# Patient Record
Sex: MALE | Race: WHITE | Employment: FULL TIME | URBAN - METROPOLITAN AREA
[De-identification: names, ages, dates, MRNs, and addresses within clinical notes are randomized per-mention and may not be internally consistent; named-entity substitution may affect disease eponyms.]

---

## 2017-01-01 ENCOUNTER — MYC REFILL (OUTPATIENT)
Dept: PEDIATRICS | Facility: CLINIC | Age: 32
End: 2017-01-01

## 2017-01-01 DIAGNOSIS — B00.1 RECURRENT COLD SORES: ICD-10-CM

## 2017-01-02 NOTE — TELEPHONE ENCOUNTER
acyclovir (ZOVIRAX) 400 MG tablet     Last Written Prescription Date: 1/11/16  Last Fill Quantity: 15, # refills: 3  Last Office Visit with [facility], [facility] or [facility] prescribing provider: 10/31/16        No results found for: CR

## 2017-01-02 NOTE — TELEPHONE ENCOUNTER
Message from [employer]:  Original authorizing provider: MD Tu Pham would like a refill of the following medications:  acyclovir (ZOVIRAX) 400 MG tablet [López Tristan MD]    Preferred pharmacy: [facility] [address], [address]    Comment:

## 2017-01-05 RX ORDER — ACYCLOVIR 400 MG/1
400 TABLET ORAL 3 TIMES DAILY
Qty: 15 TABLET | Refills: 3 | Status: SHIPPED | OUTPATIENT
Start: 2017-01-05 | End: 2017-09-05

## 2017-02-09 ENCOUNTER — MYC REFILL (OUTPATIENT)
Dept: PEDIATRICS | Facility: CLINIC | Age: 32
End: 2017-02-09

## 2017-02-09 DIAGNOSIS — F98.8 ADD (ATTENTION DEFICIT DISORDER): ICD-10-CM

## 2017-02-09 NOTE — TELEPHONE ENCOUNTER
methylphenidate ER (BRAND OR BX/ZC/AUTHORIZED GENERIC) 54 MG CR tablet      Last Written Prescription Date:  10/31/16  Last Fill Quantity: 90,   # refills: 0  Last Office Visit with [facility], [facility] or [facility] prescribing provider: 10/31/16.  Future Office visit:       Routing refill request to provider for review/approval because:  Drug not on the [facility], [facility] or [facility] refill protocol or controlled substance

## 2017-02-09 NOTE — TELEPHONE ENCOUNTER
Message from [employer]:  Original authorizing provider: MD Tu Pham would like a refill of the following medications:  methylphenidate ER (BRAND OR BX/ZC/AUTHORIZED GENERIC) 54 MG CR tablet [López Tristan MD]    Preferred pharmacy: [facility] [address], [address]    Comment:

## 2017-02-10 RX ORDER — METHYLPHENIDATE HYDROCHLORIDE 54 MG/1
54 TABLET ORAL EVERY MORNING
Qty: 90 TABLET | Refills: 0 | Status: SHIPPED | OUTPATIENT
Start: 2017-02-10 | End: 2017-03-20

## 2017-02-10 NOTE — TELEPHONE ENCOUNTER
Is due for TEJAL, fasting labs, medication recheck in 4/2017- please inform to schedule for both.  Rx is ready for . Please notify patient.

## 2017-03-20 ENCOUNTER — MYC REFILL (OUTPATIENT)
Dept: PEDIATRICS | Facility: CLINIC | Age: 32
End: 2017-03-20

## 2017-03-20 DIAGNOSIS — F98.8 ADD (ATTENTION DEFICIT DISORDER): ICD-10-CM

## 2017-03-20 RX ORDER — METHYLPHENIDATE HYDROCHLORIDE 54 MG/1
54 TABLET ORAL EVERY MORNING
Qty: 30 TABLET | Refills: 0 | Status: SHIPPED | OUTPATIENT
Start: 2017-03-20 | End: 2017-05-09

## 2017-05-05 ENCOUNTER — MYC REFILL (OUTPATIENT)
Dept: PEDIATRICS | Facility: CLINIC | Age: 32
End: 2017-05-05

## 2017-05-05 DIAGNOSIS — F98.8 ADD (ATTENTION DEFICIT DISORDER): ICD-10-CM

## 2017-05-05 RX ORDER — METHYLPHENIDATE HYDROCHLORIDE 54 MG/1
54 TABLET ORAL EVERY MORNING
Qty: 30 TABLET | Refills: 0 | OUTPATIENT
Start: 2017-05-05

## 2017-05-05 NOTE — TELEPHONE ENCOUNTER
Message from [employer]:  Original authorizing provider: MD Tu Pham would like a refill of the following medications:  methylphenidate ER (CONCERTA) 54 MG CR tablet [López Tristan MD]    Preferred pharmacy: [facility] [address], [address]    Comment:

## 2017-05-05 NOTE — TELEPHONE ENCOUNTER
Last OV- 10/2016  Is due for TEJAL, 6month recheck  I see that visit on 5/2 was cancelled  Needs office visit

## 2017-05-05 NOTE — TELEPHONE ENCOUNTER
methylphenidate ER (CONCERTA) 54 MG CR tablet      Last Written Prescription Date:  03/20/17  Last Fill Quantity: 30,   # refills: 0  Last Office Visit with [facility], [facility] or [facility] prescribing provider: 10/31/16  Future Office visit:       Routing refill request to provider for review/approval because:  Drug not on the [facility], [facility] or [facility] refill protocol or controlled substance

## 2017-05-08 ENCOUNTER — MYC MEDICAL ADVICE (OUTPATIENT)
Dept: PEDIATRICS | Facility: CLINIC | Age: 32
End: 2017-05-08

## 2017-05-08 DIAGNOSIS — F98.8 ADD (ATTENTION DEFICIT DISORDER): ICD-10-CM

## 2017-05-09 RX ORDER — METHYLPHENIDATE HYDROCHLORIDE 54 MG/1
54 TABLET ORAL EVERY MORNING
Qty: 30 TABLET | Refills: 0 | Status: SHIPPED | OUTPATIENT
Start: 2017-05-09 | End: 2017-05-23

## 2017-05-09 NOTE — TELEPHONE ENCOUNTER
My chart message sent to patient advising him that a script for Concerta has been approved and is at the , check in #1 for him to .    Keara Forbes CMA

## 2017-05-09 NOTE — TELEPHONE ENCOUNTER
methylphenidate ER (CONCERTA) 54 MG CR tablet      Last Written Prescription Date:  3/20/17  Last Fill Quantity: 30,   # refills: 0  Last Office Visit with [facility] or  [employer] prescribing provider: 10/31/16  Future Office visit:    Next 5 appointments (look out 90 days)     May 23, 2017  9:40 AM CDT   MyChart Long with López Tristan MD   [facility]    [address] [place] [address] [phone]                   Routing refill request to provider for review/approval because:  Drug not on the [facility] or  [employer] refill protocol or controlled substance    Last refill refused by Dr. Tristan on 5/5/17. Dr. Tristan out of office until 5/15/17. Routed to provider pool for review.  Joni Cordero, CMA

## 2017-05-22 ENCOUNTER — DOCUMENTATION ONLY (OUTPATIENT)
Dept: LAB | Facility: CLINIC | Age: 32
End: 2017-05-22

## 2017-05-22 DIAGNOSIS — Z13.29 SCREENING FOR THYROID DISORDER: ICD-10-CM

## 2017-05-22 DIAGNOSIS — Z13.1 SCREENING FOR DIABETES MELLITUS (DM): ICD-10-CM

## 2017-05-22 DIAGNOSIS — Z00.00 ENCOUNTER FOR ROUTINE ADULT HEALTH EXAMINATION WITHOUT ABNORMAL FINDINGS: Primary | ICD-10-CM

## 2017-05-22 DIAGNOSIS — Z13.0 SCREENING FOR DEFICIENCY ANEMIA: ICD-10-CM

## 2017-05-22 DIAGNOSIS — E78.00 HYPERCHOLESTEREMIA: ICD-10-CM

## 2017-05-22 DIAGNOSIS — R73.01 ELEVATED FASTING GLUCOSE: ICD-10-CM

## 2017-05-22 DIAGNOSIS — Z13.6 CARDIOVASCULAR SCREENING; LDL GOAL LESS THAN 160: ICD-10-CM

## 2017-05-22 NOTE — PROGRESS NOTES
Routing pended labs to provider to review.     Jeanne Luis RN, [facility] [[email or web address]]

## 2017-05-23 ENCOUNTER — OFFICE VISIT (OUTPATIENT)
Dept: PEDIATRICS | Facility: CLINIC | Age: 32
End: 2017-05-23
Payer: COMMERCIAL

## 2017-05-23 VITALS
HEART RATE: 61 BPM | WEIGHT: 246.5 LBS | HEIGHT: 70 IN | TEMPERATURE: 98.3 F | SYSTOLIC BLOOD PRESSURE: 126 MMHG | BODY MASS INDEX: 35.29 KG/M2 | DIASTOLIC BLOOD PRESSURE: 86 MMHG | OXYGEN SATURATION: 99 %

## 2017-05-23 DIAGNOSIS — E66.9 OBESITY (BMI 30-39.9): ICD-10-CM

## 2017-05-23 DIAGNOSIS — Z13.6 CARDIOVASCULAR SCREENING; LDL GOAL LESS THAN 160: ICD-10-CM

## 2017-05-23 DIAGNOSIS — Z13.29 SCREENING FOR THYROID DISORDER: ICD-10-CM

## 2017-05-23 DIAGNOSIS — Z00.00 ENCOUNTER FOR ROUTINE ADULT HEALTH EXAMINATION WITHOUT ABNORMAL FINDINGS: ICD-10-CM

## 2017-05-23 DIAGNOSIS — Z13.1 SCREENING FOR DIABETES MELLITUS (DM): ICD-10-CM

## 2017-05-23 DIAGNOSIS — R73.01 ELEVATED FASTING GLUCOSE: ICD-10-CM

## 2017-05-23 DIAGNOSIS — F98.8 ADD (ATTENTION DEFICIT DISORDER): ICD-10-CM

## 2017-05-23 DIAGNOSIS — E78.00 HYPERCHOLESTEREMIA: ICD-10-CM

## 2017-05-23 DIAGNOSIS — B00.1 RECURRENT COLD SORES: ICD-10-CM

## 2017-05-23 DIAGNOSIS — Z13.0 SCREENING FOR DEFICIENCY ANEMIA: ICD-10-CM

## 2017-05-23 DIAGNOSIS — Z00.00 ROUTINE GENERAL MEDICAL EXAMINATION AT A HEALTH CARE FACILITY: Primary | ICD-10-CM

## 2017-05-23 LAB
ALBUMIN SERPL-MCNC: 4.3 G/DL (ref 3.4–5)
ALP SERPL-CCNC: 79 U/L (ref 40–150)
ALT SERPL W P-5'-P-CCNC: 65 U/L (ref 0–70)
ANION GAP SERPL CALCULATED.3IONS-SCNC: 6 MMOL/L (ref 3–14)
AST SERPL W P-5'-P-CCNC: 30 U/L (ref 0–45)
BASOPHILS # BLD AUTO: 0 10E9/L (ref 0–0.2)
BASOPHILS NFR BLD AUTO: 0.1 %
BILIRUB SERPL-MCNC: 0.5 MG/DL (ref 0.2–1.3)
BUN SERPL-MCNC: 12 MG/DL (ref 7–30)
CALCIUM SERPL-MCNC: 9 MG/DL (ref 8.5–10.1)
CHLORIDE SERPL-SCNC: 108 MMOL/L (ref 94–109)
CHOLEST SERPL-MCNC: 194 MG/DL
CO2 SERPL-SCNC: 28 MMOL/L (ref 20–32)
CREAT SERPL-MCNC: 0.92 MG/DL (ref 0.66–1.25)
DIFFERENTIAL METHOD BLD: NORMAL
EOSINOPHIL # BLD AUTO: 0.2 10E9/L (ref 0–0.7)
EOSINOPHIL NFR BLD AUTO: 2.3 %
ERYTHROCYTE [DISTWIDTH] IN BLOOD BY AUTOMATED COUNT: 13.5 % (ref 10–15)
GFR SERPL CREATININE-BSD FRML MDRD: NORMAL ML/MIN/1.7M2
GLUCOSE SERPL-MCNC: 91 MG/DL (ref 70–99)
HBA1C MFR BLD: 5 % (ref 4.3–6)
HCT VFR BLD AUTO: 43.7 % (ref 40–53)
HDLC SERPL-MCNC: 45 MG/DL
HGB BLD-MCNC: 14.9 G/DL (ref 13.3–17.7)
LDLC SERPL CALC-MCNC: 134 MG/DL
LYMPHOCYTES # BLD AUTO: 2.5 10E9/L (ref 0.8–5.3)
LYMPHOCYTES NFR BLD AUTO: 28.4 %
MCH RBC QN AUTO: 29.6 PG (ref 26.5–33)
MCHC RBC AUTO-ENTMCNC: 34.1 G/DL (ref 31.5–36.5)
MCV RBC AUTO: 87 FL (ref 78–100)
MONOCYTES # BLD AUTO: 0.7 10E9/L (ref 0–1.3)
MONOCYTES NFR BLD AUTO: 7.9 %
NEUTROPHILS # BLD AUTO: 5.4 10E9/L (ref 1.6–8.3)
NEUTROPHILS NFR BLD AUTO: 61.3 %
NONHDLC SERPL-MCNC: 149 MG/DL
PLATELET # BLD AUTO: 222 10E9/L (ref 150–450)
POTASSIUM SERPL-SCNC: 4 MMOL/L (ref 3.4–5.3)
PROT SERPL-MCNC: 7.2 G/DL (ref 6.8–8.8)
RBC # BLD AUTO: 5.03 10E12/L (ref 4.4–5.9)
SODIUM SERPL-SCNC: 142 MMOL/L (ref 133–144)
TRIGL SERPL-MCNC: 77 MG/DL
TSH SERPL DL<=0.005 MIU/L-ACNC: 1.25 MU/L (ref 0.4–4)
WBC # BLD AUTO: 8.8 10E9/L (ref 4–11)

## 2017-05-23 PROCEDURE — 99395 PREV VISIT EST AGE 18-39: CPT | Performed by: FAMILY MEDICINE

## 2017-05-23 PROCEDURE — 83036 HEMOGLOBIN GLYCOSYLATED A1C: CPT | Performed by: FAMILY MEDICINE

## 2017-05-23 PROCEDURE — 80061 LIPID PANEL: CPT | Performed by: FAMILY MEDICINE

## 2017-05-23 PROCEDURE — 36415 COLL VENOUS BLD VENIPUNCTURE: CPT | Performed by: FAMILY MEDICINE

## 2017-05-23 PROCEDURE — 80050 GENERAL HEALTH PANEL: CPT | Performed by: FAMILY MEDICINE

## 2017-05-23 RX ORDER — METHYLPHENIDATE HYDROCHLORIDE 54 MG/1
54 TABLET ORAL EVERY MORNING
Qty: 30 TABLET | Refills: 0 | Status: SHIPPED | OUTPATIENT
Start: 2017-06-09 | End: 2017-07-25

## 2017-05-23 ASSESSMENT — PAIN SCALES - GENERAL: PAINLEVEL: NO PAIN (0)

## 2017-05-23 NOTE — PROGRESS NOTES
SUBJECTIVE:     CC: Tu Chaney is an 32 year old male who presents for preventative health visit.     Healthy Habits:    Do you get at least three servings of calcium containing foods daily (dairy, green leafy vegetables, etc.)? yes    Amount of exercise or daily activities, outside of work: 1-2 day(s) per week    Problems taking medications regularly No    Medication side effects: No    Have you had an eye exam in the past two years? no    Do you see a dentist twice per year? no    Do you have sleep apnea, excessive snoring or daytime drowsiness?no        Medication Followup of ADD    Taking Medication as prescribed: yes    Side Effects:  None    Medication Helping Symptoms:  yes     Hyperlipidemia Follow-Up      Rate your low fat/cholesterol diet?: not monitoring fat    Taking statin?  No    Other lipid medications/supplements?:  none       Today's PHQ-2 Score:   PHQ-2 ( 1999 [employer]) 5/23/2017 2/22/2016   Q1: Little interest or pleasure in doing things 0 0   Q2: Feeling down, depressed or hopeless 0 0   PHQ-2 Score 0 0       Abuse: Current or Past(Physical, Sexual or Emotional)- No  Do you feel safe in your environment - Yes    Social History   Substance Use Topics     Smoking status: Never Smoker     Smokeless tobacco: Never Used      Comment: no passive exposure     Alcohol use Yes      Comment: occ     The patient does not drink >3 drinks per day nor >7 drinks per week.    Last PSA: No results found for: PSA    Recent Labs   Lab Test  09/23/14   0808  02/15/12   0749   CHOL  208*  226*   HDL  43  37*   LDL  141*  143*   TRIG  122  230*   CHOLHDLRATIO  4.8  6.1*       Reviewed orders with patient. Reviewed health maintenance and updated orders accordingly - Yes    Reviewed and updated as needed this visit by clinical staff  Tobacco  Allergies  Meds  Med Hx  Surg Hx  Fam Hx  Soc Hx        Reviewed and updated as needed this visit by Provider          Past Medical History:   Diagnosis Date     adhd        Past Surgical History:   Procedure Laterality Date     TONSILLECTOMY              ROS:  C: NEGATIVE for fever, chills, change in weight  I: NEGATIVE for worrisome rashes, moles or lesions  E: NEGATIVE for vision changes or irritation  ENT: NEGATIVE for ear, mouth and throat problems  R: NEGATIVE for significant cough or SOB  CV: NEGATIVE for chest pain, palpitations or peripheral edema  GI: NEGATIVE for nausea, abdominal pain, heartburn, or change in bowel habits   male: negative for dysuria, hematuria, decreased urinary stream, erectile dysfunction, urethral discharge  M: NEGATIVE for significant arthralgias or myalgia  N: NEGATIVE for weakness, dizziness or paresthesias  E: NEGATIVE for temperature intolerance, skin/hair changes  H: NEGATIVE for bleeding problems  P: NEGATIVE for changes in mood or affect    Problem list, Medication list, Allergies, and Medical/Social/Surgical histories reviewed in [facility] and updated as appropriate.  Labs reviewed in EPIC  BP Readings from Last 3 Encounters:   05/23/17 126/86   10/31/16 134/80   02/22/16 140/86    Wt Readings from Last 3 Encounters:   05/23/17 246 lb 8 oz (111.8 kg)   10/31/16 244 lb 11.2 oz (111 kg)   02/22/16 242 lb 9.6 oz (110 kg)                  Patient Active Problem List   Diagnosis     ADD (attention deficit disorder)     CARDIOVASCULAR SCREENING; LDL GOAL LESS THAN 160     Recurrent cold sores     Obesity (BMI 30-39.9)     Hypercholesteremia     Elevated fasting glucose     Vasovagal near-syncope     Elevated blood pressure reading without diagnosis of hypertension     Past Surgical History:   Procedure Laterality Date     TONSILLECTOMY         Social History   Substance Use Topics     Smoking status: Never Smoker     Smokeless tobacco: Never Used      Comment: no passive exposure     Alcohol use Yes      Comment: occ     History reviewed. No pertinent family history.      Current Outpatient Prescriptions   Medication Sig Dispense Refill     [START ON  "6/9/2017] methylphenidate ER (CONCERTA) 54 MG CR tablet Take 1 tablet (54 mg) by mouth every morning 30 tablet 0     [DISCONTINUED] methylphenidate ER (CONCERTA) 54 MG CR tablet Take 1 tablet (54 mg) by mouth every morning 30 tablet 0     acyclovir (ZOVIRAX) 400 MG tablet Take 1 tablet (400 mg) by mouth 3 times daily (Patient not taking: Reported on 5/23/2017) 15 tablet 3     No Known Allergies  Recent Labs   Lab Test  05/23/17   0933  09/23/14   0808  02/15/12   0749  11/16/11   1046   A1C  5.0   --    --    --    LDL   --   141*  143*  155*   HDL   --   43  37*  42   TRIG   --   122  230*  77      OBJECTIVE:     /86  Pulse 61  Temp 98.3  F (36.8  C) (Oral)  Ht 5' 9.5\" (1.765 m)  Wt 246 lb 8 oz (111.8 kg)  SpO2 99%  BMI 35.88 kg/m2  EXAM:  GENERAL: healthy, alert and no distress  EYES: Eyes grossly normal to inspection, PERRL and conjunctivae and sclerae normal  HENT: ear canals and TM's normal, nose and mouth without ulcers or lesions  NECK: no adenopathy, no asymmetry, masses, or scars and thyroid normal to palpation  RESP: lungs clear to auscultation - no rales, rhonchi or wheezes  CV: regular rate and rhythm, normal S1 S2, no S3 or S4, no murmur, click or rub, no peripheral edema and peripheral pulses strong  ABDOMEN: soft, nontender, no hepatosplenomegaly, no masses and bowel sounds normal   (male): normal male genitalia without lesions or urethral discharge, no hernia  MS: no gross musculoskeletal defects noted, no edema  SKIN: no suspicious lesions or rashes  NEURO: Normal strength and tone, mentation intact and speech normal  PSYCH: mentation appears normal, affect normal/bright    ASSESSMENT/PLAN:     1. Routine general medical examination at a health care facility  : Discussed on regular exercises, healthy eating, self testicular exams  and routine dental checks.    2. ADD (attention deficit disorder)  stable, continue with Concerta 54 mg daily, recheck in 6 months  - methylphenidate ER " "(CONCERTA) 54 MG CR tablet; Take 1 tablet (54 mg) by mouth every morning  Dispense: 30 tablet; Refill: 0    3. CARDIOVASCULAR SCREENING; LDL GOAL LESS THAN 160  Will f/u on results and call with recommendations.      4. Recurrent cold sores  No recent episodes noted, refills are in place for zovirax    5. Elevated fasting glucose  Will f/u on results and call with recommendations.      6. Obesity (BMI 30-39.9)  Emphasized on weight loss, portion control, low calorie and low fat diet, healthy eating, regular exercises. Offered dietary consult, declined. Encouraged to enroll in a weight loss program for tracking on meal planning and weight.        COUNSELING:  Reviewed preventive health counseling, as reflected in patient instructions  Special attention given to:        Regular exercise       Healthy diet/nutrition    BP Screening:   Last 3 BP Readings:    BP Readings from Last 3 Encounters:   05/23/17 126/86   10/31/16 134/80   02/22/16 140/86       The following was recommended to the patient:  Re-screen BP within a year and recommended lifestyle modifications     reports that he has never smoked. He has never used smokeless tobacco.    Estimated body mass index is 35.88 kg/(m^2) as calculated from the following:    Height as of this encounter: 5' 9.5\" (1.765 m).    Weight as of this encounter: 246 lb 8 oz (111.8 kg).   Weight management plan: Discussed healthy diet and exercise guidelines and patient will follow up in 6 months in clinic to re-evaluate.    Counseling Resources:  ATP IV Guidelines  Pooled Cohorts Equation Calculator  FRAX Risk Assessment  ICSI Preventive Guidelines  Dietary Guidelines for Americans, 2010  USDA's MyPlate  ASA Prophylaxis  Lung CA Screening    López Tristan MD  [facility]  Chart documentation done in part with Dragon Voice recognition Software. Although reviewed after completion, some word and grammatical error may remain.    "

## 2017-05-23 NOTE — MR AVS SNAPSHOT
After Visit Summary   5/23/2017    Tu Chaney    MRN: [id number]           Patient Information     Date Of Birth          1985        Visit Information        Provider Department      5/23/2017 9:40 AM López Tristan MD [facility]        Today's Diagnoses     Routine general medical examination at a health care facility    -  1    ADD (attention deficit disorder)        CARDIOVASCULAR SCREENING; LDL GOAL LESS THAN 160        Recurrent cold sores          Care Instructions    schedule for recheck in 6 months      Preventive Health Recommendations  Male Ages 26 - 39    Yearly exam:             See your health care provider every year in order to  o   Review health changes.   o   Discuss preventive care.    o   Review your medicines if your doctor has prescribed any.    You should be tested each year for STDs (sexually transmitted diseases), if you re at risk.     After age 35, talk to your provider about cholesterol testing. If you are at risk for heart disease, have your cholesterol tested at least every 5 years.     If you are at risk for diabetes, you should have a diabetes test (fasting glucose).  Shots: Get a flu shot each year. Get a tetanus shot every 10 years.     Nutrition:    Eat at least 5 servings of fruits and vegetables daily.     Eat whole-grain bread, whole-wheat pasta and brown rice instead of white grains and rice.     Talk to your provider about Calcium and Vitamin D.     Lifestyle    Exercise for at least 150 minutes a week (30 minutes a day, 5 days a week). This will help you control your weight and prevent disease.     Limit alcohol to one drink per day.     No smoking.     Wear sunscreen to prevent skin cancer.     See your dentist every six months for an exam and cleaning.           Follow-ups after your visit        Who to contact     If you have questions or need follow up information about today's clinic visit or your schedule please contact [facility] directly at [phone].  Normal or non-critical lab and imaging results will be communicated to you by Pervaciohart, letter or phone within 4 business days after the clinic has received the results. If you do not hear from us within 7 days, please contact the clinic through Pervaciohart or phone. If you have a critical or abnormal lab result, we will notify you by phone as soon as possible.  Submit refill requests through Roadmap or call your pharmacy and they will forward the refill request to us. Please allow 3 business days for your refill to be completed.          Additional Information About Your Visit        Roadmap Information     Roadmap gives you secure access to your electronic health record. If you see a primary care provider, you can also send messages to your care team and make appointments. If you have questions, please call your primary care clinic.  If you do not have a primary care provider, please call [phone] and they will assist you.      Roadmap is an electronic gateway that provides easy, online access to your medical records. With Roadmap, you can request a clinic appointment, read your test results, renew a prescription or communicate with your care team.     To access your existing account, please contact your [facility] or call [phone] for assistance.        Care EveryWhere ID     This is your Care EveryWhere ID. This could be used by other organizations to access your [facility] medical records  [id number]        Your Vitals Were     Pulse Temperature Height Pulse Oximetry BMI (Body Mass Index)       61 98.3  F (36.8  C) (Oral) 5' 9.5\" (1.765 m) 99% 35.88 kg/m2        Blood Pressure from Last 3 Encounters:   05/23/17 126/86   10/31/16 134/80   02/22/16 140/86    Weight from Last 3 Encounters:   05/23/17 246 lb 8 oz (111.8 kg)   10/31/16 244 lb 11.2 oz (111 kg)   02/22/16 242 lb 9.6 oz (110 kg)              Today, you had the " following     No orders found for display         Where to get your medicines      Some of these will need a paper prescription and others can be bought over the counter.  Ask your nurse if you have questions.     Bring a paper prescription for each of these medications     methylphenidate ER 54 MG CR tablet          Primary Care Provider Office Phone # Fax #    López Tristan [phone]       [facility] [address]  [facility] [address]        Thank you!     Thank you for choosing [facility]  for your care. Our goal is always to provide you with excellent care. Hearing back from our patients is one way we can continue to improve our services. Please take a few minutes to complete the written survey that you may receive in the mail after your visit with us. Thank you!             Your Updated Medication List - Protect others around you: Learn how to safely use, store and throw away your medicines at www.disposemymeds.org.          This list is accurate as of: 5/23/17 10:12 AM.  Always use your most recent med list.                   Brand Name Dispense Instructions for use    acyclovir 400 MG tablet    ZOVIRAX    15 tablet    Take 1 tablet (400 mg) by mouth 3 times daily       methylphenidate ER 54 MG CR tablet   Start taking on:  6/9/2017    CONCERTA    30 tablet    Take 1 tablet (54 mg) by mouth every morning

## 2017-05-23 NOTE — NURSING NOTE
"Chief Complaint   Patient presents with     Physical       Initial /86  Pulse 61  Temp 98.3  F (36.8  C) (Oral)  Ht 5' 9.5\" (1.765 m)  Wt 246 lb 8 oz (111.8 kg)  SpO2 99%  BMI 35.88 kg/m2 Estimated body mass index is 35.88 kg/(m^2) as calculated from the following:    Height as of this encounter: 5' 9.5\" (1.765 m).    Weight as of this encounter: 246 lb 8 oz (111.8 kg).  BP completed using cuff size: red Cordero, SRINI    "

## 2017-05-23 NOTE — PATIENT INSTRUCTIONS
schedule for recheck in 6 months      Preventive Health Recommendations  Male Ages 26 - 39    Yearly exam:             See your health care provider every year in order to  o   Review health changes.   o   Discuss preventive care.    o   Review your medicines if your doctor has prescribed any.    You should be tested each year for STDs (sexually transmitted diseases), if you re at risk.     After age 35, talk to your provider about cholesterol testing. If you are at risk for heart disease, have your cholesterol tested at least every 5 years.     If you are at risk for diabetes, you should have a diabetes test (fasting glucose).  Shots: Get a flu shot each year. Get a tetanus shot every 10 years.     Nutrition:    Eat at least 5 servings of fruits and vegetables daily.     Eat whole-grain bread, whole-wheat pasta and brown rice instead of white grains and rice.     Talk to your provider about Calcium and Vitamin D.     Lifestyle    Exercise for at least 150 minutes a week (30 minutes a day, 5 days a week). This will help you control your weight and prevent disease.     Limit alcohol to one drink per day.     No smoking.     Wear sunscreen to prevent skin cancer.     See your dentist every six months for an exam and cleaning.

## 2017-05-23 NOTE — PROGRESS NOTES
Sean Mae,  Your lab results for fasting blood sugars and diabetes screening test results are both normal. The test results for thyroid functions, liver and kidneys, hemoglobin and blood count are all normal. Your fasting cholesterol is significantly improved from 2 years ago.  Please continue your efforts on healthy eating and regular exercises.   Let me know if you have any questions. Take care.  López Tristan MD

## 2017-07-25 ENCOUNTER — MYC REFILL (OUTPATIENT)
Dept: PEDIATRICS | Facility: CLINIC | Age: 32
End: 2017-07-25

## 2017-07-25 DIAGNOSIS — F98.8 ATTENTION DEFICIT DISORDER (ADD) WITHOUT HYPERACTIVITY: Primary | ICD-10-CM

## 2017-07-25 RX ORDER — METHYLPHENIDATE HYDROCHLORIDE 54 MG/1
54 TABLET ORAL EVERY MORNING
Qty: 30 TABLET | Refills: 0 | Status: SHIPPED | OUTPATIENT
Start: 2017-07-25 | End: 2017-08-27

## 2017-07-25 NOTE — TELEPHONE ENCOUNTER
methylphenidate ER (CONCERTA) 54 MG CR tablet      Last Written Prescription Date:  6/9/17  Last Fill Quantity: 30,   # refills: 0  Last Office Visit with [facility], [facility] or [facility] prescribing provider: 5/23/17  Future Office visit:       Routing refill request to provider for review/approval because:  Drug not on the [facility], [facility] or [facility] refill protocol or controlled substance

## 2017-07-25 NOTE — TELEPHONE ENCOUNTER
Message from [employer]:  Original authorizing provider: MD Tu Pham would like a refill of the following medications:  methylphenidate ER (CONCERTA) 54 MG CR tablet [López Tristan MD]    Preferred pharmacy: [facility] [address], [address]    Comment:

## 2017-08-27 ENCOUNTER — MYC REFILL (OUTPATIENT)
Dept: PEDIATRICS | Facility: CLINIC | Age: 32
End: 2017-08-27

## 2017-08-27 DIAGNOSIS — F98.8 ATTENTION DEFICIT DISORDER (ADD) WITHOUT HYPERACTIVITY: ICD-10-CM

## 2017-08-28 RX ORDER — METHYLPHENIDATE HYDROCHLORIDE 54 MG/1
54 TABLET ORAL EVERY MORNING
Qty: 30 TABLET | Refills: 0 | Status: SHIPPED | OUTPATIENT
Start: 2017-08-28 | End: 2017-10-09

## 2017-08-28 NOTE — TELEPHONE ENCOUNTER
methylphenidate ER (CONCERTA) 54 MG CR tablet      Last Written Prescription Date:  7/25/17  Last Fill Quantity: 30,   # refills: 0  Last Office Visit with [facility], [facility] or [facility] prescribing provider: 5/23/17  Future Office visit:    Next 5 appointments (look out 90 days)     Nov 14, 2017  8:00 AM CST   Return Visit with López Tristan MD   [facility]    [address] [place] [address] [phone]                   Routing refill request to provider for review/approval because:  Drug not on the [facility], [facility] or [facility] refill protocol or controlled substance

## 2017-08-28 NOTE — TELEPHONE ENCOUNTER
Message left on patients voicemail that the script he requested is ready for  at the  check in #1.    Keara Forbes CMA

## 2017-08-28 NOTE — TELEPHONE ENCOUNTER
Message from [employer]:  Original authorizing provider: MD Tu Pham would like a refill of the following medications:  methylphenidate ER (CONCERTA) 54 MG CR tablet [López Tristan MD]    Preferred pharmacy: [facility] [address], [address]    Comment:

## 2017-09-05 ENCOUNTER — MYC REFILL (OUTPATIENT)
Dept: PEDIATRICS | Facility: CLINIC | Age: 32
End: 2017-09-05

## 2017-09-05 DIAGNOSIS — B00.1 RECURRENT COLD SORES: ICD-10-CM

## 2017-09-05 NOTE — TELEPHONE ENCOUNTER
acyclovir (ZOVIRAX) 400 MG tablet     Last Written Prescription Date: 1/5/17  Last Fill Quantity: 15, # refills: 3  Last Office Visit with [facility], [facility] or [facility] prescribing provider: 5/23/17   Next 5 appointments (look out 90 days)     Nov 14, 2017  8:00 AM CST   Return Visit with López Tristan MD   [facility] ([facility]    [address] [place] [address] [phone]                   Creatinine   Date Value Ref Range Status   05/23/2017 0.92 0.66 - 1.25 mg/dL Final

## 2017-09-05 NOTE — TELEPHONE ENCOUNTER
Message from [employer]:  Original authorizing provider: MD Tu Pham would like a refill of the following medications:  acyclovir (ZOVIRAX) 400 MG tablet [López Tristan MD]    Preferred pharmacy: [facility] [address], [address]    Comment:

## 2017-09-06 RX ORDER — ACYCLOVIR 400 MG/1
400 TABLET ORAL 3 TIMES DAILY
Qty: 15 TABLET | Refills: 1 | Status: SHIPPED | OUTPATIENT
Start: 2017-09-06 | End: 2021-08-13

## 2017-10-09 ENCOUNTER — MYC REFILL (OUTPATIENT)
Dept: PEDIATRICS | Facility: CLINIC | Age: 32
End: 2017-10-09

## 2017-10-09 DIAGNOSIS — F98.8 ATTENTION DEFICIT DISORDER (ADD) WITHOUT HYPERACTIVITY: ICD-10-CM

## 2017-10-09 RX ORDER — METHYLPHENIDATE HYDROCHLORIDE 54 MG/1
54 TABLET ORAL EVERY MORNING
Qty: 30 TABLET | Refills: 0 | Status: SHIPPED | OUTPATIENT
Start: 2017-10-09 | End: 2017-11-14

## 2017-10-09 NOTE — TELEPHONE ENCOUNTER
Message from [employer]:  Original authorizing provider: MD Tu Pham would like a refill of the following medications:  methylphenidate ER (CONCERTA) 54 MG CR tablet [López Tristan MD]    Preferred pharmacy: [facility] [address], [address]    Comment:

## 2017-10-09 NOTE — TELEPHONE ENCOUNTER
methylphenidate ER (CONCERTA) 54 MG CR tablet      Last Written Prescription Date:  8/28/17  Last Fill Quantity: 30,   # refills: 0  Last Office Visit with [facility], [facility] or [facility] prescribing provider: 5/23/17  Future Office visit:    Next 5 appointments (look out 90 days)     Nov 14, 2017  8:00 AM CST   Return Visit with López Tristan MD   [facility]    [address] [place] [address] [phone]                   Routing refill request to provider for review/approval because:  Drug not on the [facility], [facility] or [facility] refill protocol or controlled substance

## 2017-11-14 ENCOUNTER — OFFICE VISIT (OUTPATIENT)
Dept: PEDIATRICS | Facility: CLINIC | Age: 32
End: 2017-11-14
Payer: COMMERCIAL

## 2017-11-14 VITALS
BODY MASS INDEX: 36.8 KG/M2 | WEIGHT: 252.8 LBS | OXYGEN SATURATION: 97 % | SYSTOLIC BLOOD PRESSURE: 132 MMHG | HEART RATE: 68 BPM | TEMPERATURE: 98.3 F | DIASTOLIC BLOOD PRESSURE: 80 MMHG

## 2017-11-14 DIAGNOSIS — F98.8 ATTENTION DEFICIT DISORDER (ADD) WITHOUT HYPERACTIVITY: ICD-10-CM

## 2017-11-14 PROCEDURE — 99213 OFFICE O/P EST LOW 20 MIN: CPT | Performed by: FAMILY MEDICINE

## 2017-11-14 RX ORDER — METHYLPHENIDATE HYDROCHLORIDE 54 MG/1
54 TABLET ORAL EVERY MORNING
Qty: 30 TABLET | Refills: 0 | Status: SHIPPED | OUTPATIENT
Start: 2017-11-14 | End: 2017-12-09

## 2017-11-14 ASSESSMENT — PAIN SCALES - GENERAL: PAINLEVEL: NO PAIN (0)

## 2017-11-14 NOTE — MR AVS SNAPSHOT
After Visit Summary   11/14/2017    Tu Chaney    MRN: [id number]           Patient Information     Date Of Birth          1985        Visit Information        Provider Department      11/14/2017 8:10 AM López Tristan MD [facility]        Today's Diagnoses     Attention deficit disorder (ADD) without hyperactivity          Care Instructions    Schedule for physical, fasting labs in 6 months          Follow-ups after your visit        Follow-up notes from your care team     Return in about 6 months (around 5/14/2018) for Fasting lab work, Physical Exam.      Who to contact     If you have questions or need follow up information about today's clinic visit or your schedule please contact [facility] directly at [phone].  Normal or non-critical lab and imaging results will be communicated to you by Unypehart, letter or phone within 4 business days after the clinic has received the results. If you do not hear from us within 7 days, please contact the clinic through iJukeboxt or phone. If you have a critical or abnormal lab result, we will notify you by phone as soon as possible.  Submit refill requests through Rebiotix or call your pharmacy and they will forward the refill request to us. Please allow 3 business days for your refill to be completed.          Additional Information About Your Visit        MyChart Information     Rebiotix gives you secure access to your electronic health record. If you see a primary care provider, you can also send messages to your care team and make appointments. If you have questions, please call your primary care clinic.  If you do not have a primary care provider, please call [phone] and they will assist you.      Rebiotix is an electronic gateway that provides easy, online access to your medical records. With Rebiotix, you can request a clinic appointment, read your test results, renew a prescription or communicate with  your care team.     To access your existing account, please contact your [facility] or call [phone] for assistance.        Care EveryWhere ID     This is your Care EveryWhere ID. This could be used by other organizations to access your [place] medical records  [id number]        Your Vitals Were     Pulse Temperature Pulse Oximetry BMI (Body Mass Index)          68 98.3  F (36.8  C) (Oral) 97% 36.8 kg/m2         Blood Pressure from Last 3 Encounters:   11/14/17 132/80   05/23/17 126/86   10/31/16 134/80    Weight from Last 3 Encounters:   11/14/17 252 lb 12.8 oz (114.7 kg)   05/23/17 246 lb 8 oz (111.8 kg)   10/31/16 244 lb 11.2 oz (111 kg)              Today, you had the following     No orders found for display         Where to get your medicines      Some of these will need a paper prescription and others can be bought over the counter.  Ask your nurse if you have questions.     Bring a paper prescription for each of these medications     methylphenidate ER 54 MG CR tablet          Primary Care Provider Office Phone # Fax #    López Tristan [phone] [address]  [facility] [address]        Equal Access to Services     KEON APARICIO : Hadii jojo ku hadasho Soomaali, waaxda luqadaha, qaybta kaalmada adeegyada, waxay za falcon. So [facility] [phone].    ATENCIÓN: Si habla español, tiene a richmond disposición servicios gratuitos de asistencia lingüística. [facility] [phone].    We comply with applicable federal civil rights laws and [place] laws. We do not discriminate on the basis of race, color, national origin, age, disability, sex, sexual orientation, or gender identity.            Thank you!     Thank you for choosing [facility]  for your care. Our goal is always to provide you with excellent care. Hearing back from our patients is one way we can continue to improve our services. Please take a few  minutes to complete the written survey that you may receive in the mail after your visit with us. Thank you!             Your Updated Medication List - Protect others around you: Learn how to safely use, store and throw away your medicines at www.disposemymeds.org.          This list is accurate as of: 11/14/17  8:21 AM.  Always use your most recent med list.                   Brand Name Dispense Instructions for use Diagnosis    acyclovir 400 MG tablet    ZOVIRAX    15 tablet    Take 1 tablet (400 mg) by mouth 3 times daily    Recurrent cold sores       methylphenidate ER 54 MG CR tablet    CONCERTA    30 tablet    Take 1 tablet (54 mg) by mouth every morning    Attention deficit disorder (ADD) without hyperactivity

## 2017-11-14 NOTE — PROGRESS NOTES
SUBJECTIVE:   Tu Chaney is a 32 year old male who presents to clinic today for the following health issues:      Medication Followup of methylphenidate ER (CONCERTA) 54 MG CR tablet    Taking Medication as prescribed: yes    Side Effects:  None    Medication Helping Symptoms:  yes             Problem list and histories reviewed & adjusted, as indicated.  Additional history: as documented    Patient Active Problem List   Diagnosis     CARDIOVASCULAR SCREENING; LDL GOAL LESS THAN 160     Recurrent cold sores     Obesity (BMI 30-39.9)     Hypercholesteremia     Elevated fasting glucose     Vasovagal near-syncope     Elevated blood pressure reading without diagnosis of hypertension     Attention deficit disorder (ADD) without hyperactivity     Past Surgical History:   Procedure Laterality Date     TONSILLECTOMY         Social History   Substance Use Topics     Smoking status: Never Smoker     Smokeless tobacco: Never Used      Comment: no passive exposure     Alcohol use Yes      Comment: occ     History reviewed. No pertinent family history.      Current Outpatient Prescriptions   Medication Sig Dispense Refill     methylphenidate ER (CONCERTA) 54 MG CR tablet Take 1 tablet (54 mg) by mouth every morning 30 tablet 0     acyclovir (ZOVIRAX) 400 MG tablet Take 1 tablet (400 mg) by mouth 3 times daily 15 tablet 1     [DISCONTINUED] methylphenidate ER (CONCERTA) 54 MG CR tablet Take 1 tablet (54 mg) by mouth every morning 30 tablet 0     No Known Allergies  Recent Labs   Lab Test  05/23/17   0933  09/23/14   0808  02/15/12   0749   A1C  5.0   --    --    LDL  134*  141*  143*   HDL  45  43  37*   TRIG  77  122  230*   ALT  65   --    --    CR  0.92   --    --    GFRESTIMATED  >90  Non  GFR Calc     --    --    GFRESTBLACK  >90   GFR Calc     --    --    POTASSIUM  4.0   --    --    TSH  1.25   --    --       BP Readings from Last 3 Encounters:   11/14/17 132/80   05/23/17 126/86    10/31/16 134/80    Wt Readings from Last 3 Encounters:   11/14/17 252 lb 12.8 oz (114.7 kg)   05/23/17 246 lb 8 oz (111.8 kg)   10/31/16 244 lb 11.2 oz (111 kg)                  Labs reviewed in EPIC          Reviewed and updated as needed this visit by clinical staffTobacco  Allergies  Meds  Med Hx  Surg Hx  Fam Hx  Soc Hx      Reviewed and updated as needed this visit by Provider         ROS:  C: NEGATIVE for fever, chills, change in weight  R: NEGATIVE for significant cough or SOB  CV: NEGATIVE for chest pain, palpitations or peripheral edema  ENDOCRINE: NEGATIVE for temperature intolerance, skin/hair changes  PSYCHIATRIC: NEGATIVE for changes in mood or affect and History of attention deficit disorder    OBJECTIVE:     /80  Pulse 68  Temp 98.3  F (36.8  C) (Oral)  Wt 252 lb 12.8 oz (114.7 kg)  SpO2 97%  BMI 36.8 kg/m2  Body mass index is 36.8 kg/(m^2).  GENERAL: healthy, alert and no distress  RESP: lungs clear to auscultation - no rales, rhonchi or wheezes  CV: regular rate and rhythm, normal S1 S2, no S3 or S4, no murmur, click or rub, no peripheral edema and peripheral pulses strong  NEURO: Normal strength and tone, mentation intact and speech normal  PSYCH: mentation appears normal, affect normal/bright    Diagnostic Test Results:  none     ASSESSMENT/PLAN:             1. Attention deficit disorder (ADD) without hyperactivity  Stable, continue with Concerta 54 mg daily, recheck in 6 weeks at the time of physical or sooner if needed  - methylphenidate ER (CONCERTA) 54 MG CR tablet; Take 1 tablet (54 mg) by mouth every morning  Dispense: 30 tablet; Refill: 0    Work on weight loss  Regular exercise  Chart documentation done in part with Dragon Voice recognition Software. Although reviewed after completion, some word and grammatical error may remain.    See Patient Instructions    López Tristan MD  [facility]

## 2017-11-14 NOTE — NURSING NOTE
"Chief Complaint   Patient presents with     Recheck Medication       Initial /80  Pulse 68  Temp 98.3  F (36.8  C) (Oral)  Wt 252 lb 12.8 oz (114.7 kg)  SpO2 97%  BMI 36.8 kg/m2 Estimated body mass index is 36.8 kg/(m^2) as calculated from the following:    Height as of 5/23/17: 5' 9.5\" (1.765 m).    Weight as of this encounter: 252 lb 12.8 oz (114.7 kg).  BP completed using cuff size: red Cordero, SRINI    "

## 2017-12-09 ENCOUNTER — MYC REFILL (OUTPATIENT)
Dept: PEDIATRICS | Facility: CLINIC | Age: 32
End: 2017-12-09

## 2017-12-09 DIAGNOSIS — F98.8 ATTENTION DEFICIT DISORDER (ADD) WITHOUT HYPERACTIVITY: ICD-10-CM

## 2017-12-11 RX ORDER — METHYLPHENIDATE HYDROCHLORIDE 54 MG/1
54 TABLET ORAL EVERY MORNING
Qty: 30 TABLET | Refills: 0 | Status: SHIPPED | OUTPATIENT
Start: 2017-12-14 | End: 2018-01-25

## 2017-12-11 NOTE — TELEPHONE ENCOUNTER
Message from Aha Mobilehart:  Original authorizing provider: MD Tu Pham would like a refill of the following medications:  methylphenidate ER (CONCERTA) 54 MG CR tablet [López Tristan MD]    Preferred pharmacy: [facility] [address], [address]    Comment:

## 2017-12-11 NOTE — TELEPHONE ENCOUNTER
Routing refill request to provider for review/approval because:  Drug not on the [facility] refill protocol     methylphenidate ER (CONCERTA) 54 MG CR tablet    Last Written Prescription Date: 11/14/2017  Last Fill Quantity: 30,  # refills: 0   Last Office Visit with [facility], [facility] or [facility] prescribing provider: 11/14/2017    Giovanna Hayden RN

## 2017-12-11 NOTE — TELEPHONE ENCOUNTER
My chart message sent to patient advising him methylphenidate 54 mg script sent to [facility] pharmacy.    Keara Forbes CMA

## 2018-01-25 ENCOUNTER — MYC REFILL (OUTPATIENT)
Dept: PEDIATRICS | Facility: CLINIC | Age: 33
End: 2018-01-25

## 2018-01-25 DIAGNOSIS — F98.8 ATTENTION DEFICIT DISORDER (ADD) WITHOUT HYPERACTIVITY: ICD-10-CM

## 2018-01-25 RX ORDER — METHYLPHENIDATE HYDROCHLORIDE 54 MG/1
54 TABLET ORAL EVERY MORNING
Qty: 30 TABLET | Refills: 0 | Status: SHIPPED | OUTPATIENT
Start: 2018-01-25 | End: 2018-03-02

## 2018-01-25 NOTE — TELEPHONE ENCOUNTER
Message from Moziohart:  Original authorizing provider: MD Tu Pham would like a refill of the following medications:  methylphenidate ER (CONCERTA) 54 MG CR tablet [López Tristan MD]    Preferred pharmacy: [facility] [id number], [address]    Comment:

## 2018-01-25 NOTE — TELEPHONE ENCOUNTER
Routing refill request to provider for review/approval because:  Drug not on the [facility] refill protocol       methylphenidate ER (CONCERTA) 54 MG CR tablet 30 tablet 0 12/14/2017  No      Sig: Take 1 tablet (54 mg) by mouth every morning         Last office visit:  11/14/17      Francisca Vasquez RN,   [facility]

## 2018-03-02 ENCOUNTER — MYC REFILL (OUTPATIENT)
Dept: PEDIATRICS | Facility: CLINIC | Age: 33
End: 2018-03-02

## 2018-03-02 DIAGNOSIS — F98.8 ATTENTION DEFICIT DISORDER (ADD) WITHOUT HYPERACTIVITY: ICD-10-CM

## 2018-03-02 RX ORDER — METHYLPHENIDATE HYDROCHLORIDE 54 MG/1
54 TABLET ORAL EVERY MORNING
Qty: 30 TABLET | Refills: 0 | Status: SHIPPED | OUTPATIENT
Start: 2018-03-02 | End: 2018-04-04

## 2018-03-02 NOTE — TELEPHONE ENCOUNTER
Routing refill request to provider for review/approval because:  Drug not on the [facility] refill protocol       methylphenidate ER (CONCERTA) 54 MG CR tablet 30 tablet 0 1/25/2018  No   Sig: Take 1 tablet (54 mg) by mouth every morning     Last OV with Dr. Tristan: 11/14/2017    Future OV: none    Giovanna Hayden RN

## 2018-03-02 NOTE — TELEPHONE ENCOUNTER
Message from [email or web address]:  Original authorizing provider: Maynor Huynh MD PhD    Tu Chaney would like a refill of the following medications:  methylphenidate ER (CONCERTA) 54 MG CR tablet [Maynor Huynh MD PhD]    Preferred pharmacy: [facility] [address], [address]    Comment:

## 2018-03-15 ENCOUNTER — TELEPHONE (OUTPATIENT)
Dept: PEDIATRICS | Facility: CLINIC | Age: 33
End: 2018-03-15

## 2018-03-15 NOTE — TELEPHONE ENCOUNTER
2nd attempt    Left message for patient to return clinic call regarding scheduling. Patient needs a Physical  appointment for annual exam with Dr Tristan on or after May 14, 2018. Number to clinic and Mychart option given, please assist in scheduling once patient returns clinic call.    Call Center OKAY TO SCHEDULE.    Thanks,   Amanda Cottrell  Primary Care   [facility] [place]    Recall letter sent in February

## 2018-04-04 ENCOUNTER — MYC REFILL (OUTPATIENT)
Dept: PEDIATRICS | Facility: CLINIC | Age: 33
End: 2018-04-04

## 2018-04-04 DIAGNOSIS — F98.8 ATTENTION DEFICIT DISORDER (ADD) WITHOUT HYPERACTIVITY: ICD-10-CM

## 2018-04-04 RX ORDER — METHYLPHENIDATE HYDROCHLORIDE 54 MG/1
54 TABLET ORAL EVERY MORNING
Qty: 30 TABLET | Refills: 0 | Status: SHIPPED | OUTPATIENT
Start: 2018-04-04 | End: 2018-05-04

## 2018-04-04 NOTE — TELEPHONE ENCOUNTER
methylphenidate ER (CONCERTA) 54 MG CR tablet      Last Written Prescription Date:  3/2/18  Last Fill Quantity: 30,   # refills: 0  Last Office Visit: 11/14/17  Future Office visit:       Routing refill request to provider for review/approval because:  Drug not on the [facility], [facility] or [facility] refill protocol or controlled substance

## 2018-04-04 NOTE — TELEPHONE ENCOUNTER
Please inform patient, refill/prescription approved and when prescription hard copy is ready to be picked up at .      Due for office visit with Dr. Tristan before next refill.    I will STOP taking the medications listed below when I get home from the hospital:  None

## 2018-04-04 NOTE — TELEPHONE ENCOUNTER
Message from (In)Touch Networkhart:  Original authorizing provider: MD Tu Pham would like a refill of the following medications:  methylphenidate ER (CONCERTA) 54 MG CR tablet [López Tristan MD]    Preferred pharmacy: [facility] [address], [address]    Comment:

## 2018-05-02 ENCOUNTER — DOCUMENTATION ONLY (OUTPATIENT)
Dept: LAB | Facility: CLINIC | Age: 33
End: 2018-05-02

## 2018-05-02 DIAGNOSIS — Z13.6 CARDIOVASCULAR SCREENING; LDL GOAL LESS THAN 160: Primary | ICD-10-CM

## 2018-05-02 DIAGNOSIS — R73.01 ELEVATED FASTING GLUCOSE: ICD-10-CM

## 2018-05-02 NOTE — PROGRESS NOTES
No health maintenance labs due. Routed to PCP to review and order.    Francisca Vasquez RN,   [facility]

## 2018-05-04 ENCOUNTER — OFFICE VISIT (OUTPATIENT)
Dept: PEDIATRICS | Facility: CLINIC | Age: 33
End: 2018-05-04
Payer: COMMERCIAL

## 2018-05-04 VITALS
DIASTOLIC BLOOD PRESSURE: 78 MMHG | HEART RATE: 65 BPM | WEIGHT: 253.1 LBS | BODY MASS INDEX: 36.84 KG/M2 | SYSTOLIC BLOOD PRESSURE: 114 MMHG | TEMPERATURE: 97.5 F | OXYGEN SATURATION: 98 %

## 2018-05-04 DIAGNOSIS — E78.00 HYPERCHOLESTEREMIA: ICD-10-CM

## 2018-05-04 DIAGNOSIS — E66.9 OBESITY (BMI 30-39.9): ICD-10-CM

## 2018-05-04 DIAGNOSIS — Z13.6 CARDIOVASCULAR SCREENING; LDL GOAL LESS THAN 160: Primary | ICD-10-CM

## 2018-05-04 DIAGNOSIS — R03.0 ELEVATED BLOOD PRESSURE READING WITHOUT DIAGNOSIS OF HYPERTENSION: ICD-10-CM

## 2018-05-04 DIAGNOSIS — F98.8 ATTENTION DEFICIT DISORDER (ADD) WITHOUT HYPERACTIVITY: ICD-10-CM

## 2018-05-04 DIAGNOSIS — R73.01 ELEVATED FASTING GLUCOSE: ICD-10-CM

## 2018-05-04 DIAGNOSIS — Z13.6 CARDIOVASCULAR SCREENING; LDL GOAL LESS THAN 160: ICD-10-CM

## 2018-05-04 LAB
ALBUMIN SERPL-MCNC: 4.1 G/DL (ref 3.4–5)
ALP SERPL-CCNC: 78 U/L (ref 40–150)
ALT SERPL W P-5'-P-CCNC: 50 U/L (ref 0–70)
ANION GAP SERPL CALCULATED.3IONS-SCNC: 6 MMOL/L (ref 3–14)
AST SERPL W P-5'-P-CCNC: 24 U/L (ref 0–45)
BILIRUB SERPL-MCNC: 0.4 MG/DL (ref 0.2–1.3)
BUN SERPL-MCNC: 14 MG/DL (ref 7–30)
CALCIUM SERPL-MCNC: 8.8 MG/DL (ref 8.5–10.1)
CHLORIDE SERPL-SCNC: 108 MMOL/L (ref 94–109)
CHOLEST SERPL-MCNC: 189 MG/DL
CO2 SERPL-SCNC: 29 MMOL/L (ref 20–32)
CREAT SERPL-MCNC: 1 MG/DL (ref 0.66–1.25)
GFR SERPL CREATININE-BSD FRML MDRD: 87 ML/MIN/1.7M2
GLUCOSE SERPL-MCNC: 97 MG/DL (ref 70–99)
HDLC SERPL-MCNC: 36 MG/DL
LDLC SERPL CALC-MCNC: 135 MG/DL
NONHDLC SERPL-MCNC: 153 MG/DL
POTASSIUM SERPL-SCNC: 4 MMOL/L (ref 3.4–5.3)
PROT SERPL-MCNC: 7.5 G/DL (ref 6.8–8.8)
SODIUM SERPL-SCNC: 143 MMOL/L (ref 133–144)
TRIGL SERPL-MCNC: 92 MG/DL

## 2018-05-04 PROCEDURE — 80061 LIPID PANEL: CPT | Performed by: FAMILY MEDICINE

## 2018-05-04 PROCEDURE — 99214 OFFICE O/P EST MOD 30 MIN: CPT | Performed by: FAMILY MEDICINE

## 2018-05-04 PROCEDURE — 80053 COMPREHEN METABOLIC PANEL: CPT | Performed by: FAMILY MEDICINE

## 2018-05-04 PROCEDURE — 36415 COLL VENOUS BLD VENIPUNCTURE: CPT | Performed by: FAMILY MEDICINE

## 2018-05-04 RX ORDER — METHYLPHENIDATE HYDROCHLORIDE 54 MG/1
54 TABLET ORAL EVERY MORNING
Qty: 30 TABLET | Refills: 0 | Status: SHIPPED | OUTPATIENT
Start: 2018-05-04 | End: 2018-06-18

## 2018-05-04 ASSESSMENT — PAIN SCALES - GENERAL: PAINLEVEL: NO PAIN (0)

## 2018-05-04 NOTE — PROGRESS NOTES
SUBJECTIVE:   Tu Chaney is a 32 year old male who presents to clinic today for the following health issues:      Results - Follow up lab results.    Medication Followup of methylphenidate ER (CONCERTA) 54 MG CR tablet    Taking Medication as prescribed: yes    Side Effects:  None    Medication Helping Symptoms:  yes       Hyperlipidemia Follow-Up      Rate your low fat/cholesterol diet?: good    Taking statin?  No    Other lipid medications/supplements?:  none      Problem list and histories reviewed & adjusted, as indicated.  Additional history: as documented    Patient Active Problem List   Diagnosis     CARDIOVASCULAR SCREENING; LDL GOAL LESS THAN 160     Recurrent cold sores     Obesity (BMI 30-39.9)     Hypercholesteremia     Elevated fasting glucose     Vasovagal near-syncope     Elevated blood pressure reading without diagnosis of hypertension     Attention deficit disorder (ADD) without hyperactivity     Past Surgical History:   Procedure Laterality Date     TONSILLECTOMY         Social History   Substance Use Topics     Smoking status: Never Smoker     Smokeless tobacco: Never Used      Comment: no passive exposure     Alcohol use Yes      Comment: occ     History reviewed. No pertinent family history.      Current Outpatient Prescriptions   Medication Sig Dispense Refill     acyclovir (ZOVIRAX) 400 MG tablet Take 1 tablet (400 mg) by mouth 3 times daily 15 tablet 1     methylphenidate ER (CONCERTA) 54 MG CR tablet Take 1 tablet (54 mg) by mouth every morning 30 tablet 0     [DISCONTINUED] methylphenidate ER (CONCERTA) 54 MG CR tablet Take 1 tablet (54 mg) by mouth every morning (due for office visit for future refill) 30 tablet 0     No Known Allergies  Recent Labs   Lab Test  05/04/18   0849  05/23/17   0933  09/23/14   0808   A1C   --   5.0   --    LDL  135*  134*  141*   HDL  36*  45  43   TRIG  92  77  122   ALT  50  65   --    CR  1.00  0.92   --    GFRESTIMATED  87  >90  Non   GFR Calc     --    GFRESTBLACK  >90  >90  African American GFR Calc     --    POTASSIUM  4.0  4.0   --    TSH   --   1.25   --       BP Readings from Last 3 Encounters:   05/04/18 114/78   11/14/17 132/80   05/23/17 126/86    Wt Readings from Last 3 Encounters:   05/04/18 253 lb 1.6 oz (114.8 kg)   11/14/17 252 lb 12.8 oz (114.7 kg)   05/23/17 246 lb 8 oz (111.8 kg)                  Labs reviewed in EPIC    Reviewed and updated as needed this visit by clinical staff  Tobacco  Allergies  Meds  Med Hx  Surg Hx  Fam Hx  Soc Hx      Reviewed and updated as needed this visit by Provider         ROS:  CONSTITUTIONAL: NEGATIVE for fever, chills, change in weight  RESP: NEGATIVE for significant cough or SOB  CV: NEGATIVE for chest pain, palpitations or peripheral edema  GI: NEGATIVE for nausea, abdominal pain, heartburn, or change in bowel habits  MUSCULOSKELETAL: NEGATIVE for significant arthralgias or myalgia  ENDOCRINE: NEGATIVE for temperature intolerance, skin/hair changes  PSYCHIATRIC: History of ADHD without hyperactivity    OBJECTIVE:     /78 (BP Location: Right arm, Patient Position: Sitting, Cuff Size: Adult Large)  Pulse 65  Temp 97.5  F (36.4  C) (Oral)  Wt 253 lb 1.6 oz (114.8 kg)  SpO2 98%  BMI 36.84 kg/m2  Body mass index is 36.84 kg/(m^2).  GENERAL: healthy, alert and no distress  NECK: no adenopathy, no asymmetry, masses, or scars and thyroid normal to palpation  RESP: lungs clear to auscultation - no rales, rhonchi or wheezes  CV: regular rate and rhythm, normal S1 S2, no S3 or S4, no murmur, click or rub, no peripheral edema and peripheral pulses strong  MS: no gross musculoskeletal defects noted, no edema  PSYCH: mentation appears normal, affect normal/bright    Diagnostic Test Results:  Results for orders placed or performed in visit on 05/04/18 (from the past 24 hour(s))   **Comprehensive metabolic panel FUTURE anytime   Result Value Ref Range    Sodium 143 133 - 144 mmol/L     "Potassium 4.0 3.4 - 5.3 mmol/L    Chloride 108 94 - 109 mmol/L    Carbon Dioxide 29 20 - 32 mmol/L    Anion Gap 6 3 - 14 mmol/L    Glucose 97 70 - 99 mg/dL    Urea Nitrogen 14 7 - 30 mg/dL    Creatinine 1.00 0.66 - 1.25 mg/dL    GFR Estimate 87 >60 mL/min/1.7m2    GFR Estimate If Black >90 >60 mL/min/1.7m2    Calcium 8.8 8.5 - 10.1 mg/dL    Bilirubin Total 0.4 0.2 - 1.3 mg/dL    Albumin 4.1 3.4 - 5.0 g/dL    Protein Total 7.5 6.8 - 8.8 g/dL    Alkaline Phosphatase 78 40 - 150 U/L    ALT 50 0 - 70 U/L    AST 24 0 - 45 U/L   Lipid panel reflex to direct LDL Fasting   Result Value Ref Range    Cholesterol 189 <200 mg/dL    Triglycerides 92 <150 mg/dL    HDL Cholesterol 36 (L) >39 mg/dL    LDL Cholesterol Calculated 135 (H) <100 mg/dL    Non HDL Cholesterol 153 (H) <130 mg/dL       ASSESSMENT/PLAN:         BMI:   Estimated body mass index is 36.84 kg/(m^2) as calculated from the following:    Height as of 5/23/17: 5' 9.5\" (1.765 m).    Weight as of this encounter: 253 lb 1.6 oz (114.8 kg).   Weight management plan: Discussed healthy diet and exercise guidelines and patient will follow up in 6 months in clinic to re-evaluate.      1. Attention deficit disorder (ADD) without hyperactivity  Stable, continue with Concerta 54 mg daily, follow for recheck in 6 months at the time of physical or sooner if needed.  - methylphenidate ER (CONCERTA) 54 MG CR tablet; Take 1 tablet (54 mg) by mouth every morning  Dispense: 30 tablet; Refill: 0    2. CARDIOVASCULAR SCREENING; LDL GOAL LESS THAN 160  LDL Cholesterol Calculated   Date Value Ref Range Status   05/04/2018 135 (H) <100 mg/dL Final     Comment:     Above desirable:  100-129 mg/dl  Borderline High:  130-159 mg/dL  High:             160-189 mg/dL  Very high:       >189 mg/dl     ]      3. Hypercholesteremia  Lab Results   Component Value Date    CHOL 189 05/04/2018     Lab Results   Component Value Date    HDL 36 05/04/2018     Lab Results   Component Value Date     " 05/04/2018     Lab Results   Component Value Date    TRIG 92 05/04/2018     Lab Results   Component Value Date    CHOLHDLRATIO 4.8 09/23/2014     Reviewed slightly improved but persistently elevated fasting cholesterol numbers, recommended to consult dietitian for further help, emphasized on regular exercises  Recheck at the time of next visit in 6 months or sooner if needed.  - NUTRITION REFERRAL    4. Elevated fasting glucose  Glucose   Date Value Ref Range Status   05/04/2018 97 70 - 99 mg/dL Final     Comment:     Fasting specimen   ]      Negative test results reviewed with the patient and reassured.    - NUTRITION REFERRAL    5. Elevated blood pressure reading without diagnosis of hypertension  BP Readings from Last 6 Encounters:   05/04/18 114/78   11/14/17 132/80   05/23/17 126/86   10/31/16 134/80   02/22/16 140/86   06/23/15 135/72     Blood pressure is at goal today, continue with low-salt diet, regular exercises, start on weight loss    - NUTRITION REFERRAL    6. Obesity (BMI 30-39.9)  Wt Readings from Last 5 Encounters:   05/04/18 253 lb 1.6 oz (114.8 kg)   11/14/17 252 lb 12.8 oz (114.7 kg)   05/23/17 246 lb 8 oz (111.8 kg)   10/31/16 244 lb 11.2 oz (111 kg)   02/22/16 242 lb 9.6 oz (110 kg)     Emphasized on weight loss, portion control, low calorie and low fat diet, healthy eating, regular exercises. Offered dietary consult,    Encouraged to enroll in a weight loss program for tracking on meal planning and weight.    - NUTRITION REFERRAL    Work on weight loss  Regular exercise  Chart documentation done in part with Dragon Voice recognition Software. Although reviewed after completion, some word and grammatical error may remain.    See Patient Instructions    López Tristan MD  [facility]

## 2018-05-04 NOTE — MR AVS SNAPSHOT
After Visit Summary   5/4/2018    Tu Chaney    MRN: [id number]           Patient Information     Date Of Birth          1985        Visit Information        Provider Department      5/4/2018 9:10 AM López Tristan MD [facility]        Today's Diagnoses     CARDIOVASCULAR SCREENING; LDL GOAL LESS THAN 160    -  1    Attention deficit disorder (ADD) without hyperactivity        Hypercholesteremia        Elevated fasting glucose        Elevated blood pressure reading without diagnosis of hypertension        Obesity (BMI 30-39.9)          Care Instructions    Schedule for fasting labs and physical in 6 months  Schedule for dietician consult          Follow-ups after your visit        Additional Services     NUTRITION REFERRAL       Your provider has referred you to: [facility]: [facility] [phone]   [email or web address]    Please be aware that coverage of these services is subject to the terms and limitations of your health insurance plan.  Call member services at your health plan with any benefit or coverage questions.      Please bring the following to your appointment:      >>   This referral request   >>   Any documents given to you for this referral  >>   Any specific questions you have about diet or food choices                  Follow-up notes from your care team     Return in about 6 months (around 11/4/2018) for Fasting lab work, Physical.      Who to contact     If you have questions or need follow up information about today's clinic visit or your schedule please contact [facility] directly at [phone].  Normal or non-critical lab and imaging results will be communicated to you by MyChart, letter or phone within 4 business days after the clinic has received the results. If you do not hear from us within 7 days, please contact the clinic through MyChart or phone. If you have a critical  or abnormal lab result, we will notify you by phone as soon as possible.  Submit refill requests through [employer] or call your pharmacy and they will forward the refill request to us. Please allow 3 business days for your refill to be completed.          Additional Information About Your Visit        [employer] Information     [employer] gives you secure access to your electronic health record. If you see a primary care provider, you can also send messages to your care team and make appointments. If you have questions, please call your primary care clinic.  If you do not have a primary care provider, please call [phone] and they will assist you.      [employer] is an electronic gateway that provides easy, online access to your medical records. With [employer], you can request a clinic appointment, read your test results, renew a prescription or communicate with your care team.     To access your existing account, please contact your [facility] or call [phone] for assistance.        Care EveryWhere ID     This is your Care EveryWhere ID. This could be used by other organizations to access your [place] medical records  [id number]        Your Vitals Were     Pulse Temperature Pulse Oximetry BMI (Body Mass Index)          65 97.5  F (36.4  C) (Oral) 98% 36.84 kg/m2         Blood Pressure from Last 3 Encounters:   05/04/18 114/78   11/14/17 132/80   05/23/17 126/86    Weight from Last 3 Encounters:   05/04/18 253 lb 1.6 oz (114.8 kg)   11/14/17 252 lb 12.8 oz (114.7 kg)   05/23/17 246 lb 8 oz (111.8 kg)              We Performed the Following     NUTRITION REFERRAL          Today's Medication Changes          These changes are accurate as of 5/4/18  9:27 AM.  If you have any questions, ask your nurse or doctor.               These medicines have changed or have updated prescriptions.        Dose/Directions    methylphenidate ER 54 MG CR tablet   Commonly known as:  CONCERTA   This may have  changed:  additional instructions   Used for:  Attention deficit disorder (ADD) without hyperactivity   Changed by:  López Tristan MD        Dose:  54 mg   Take 1 tablet (54 mg) by mouth every morning   Quantity:  30 tablet   Refills:  0            Where to get your medicines      Some of these will need a paper prescription and others can be bought over the counter.  Ask your nurse if you have questions.     Bring a paper prescription for each of these medications     methylphenidate ER 54 MG CR tablet                Primary Care Provider Office Phone # Fax #    López Tristan [phone] [address]  [facility] [address]        Equal Access to Services     [facility]: Hadii jojo Eaton, waaxda jakub, qaybta kaalmada dilma, elyssa landis . So [facility] [phone].    ATENCIÓN: Si habla español, tiene a richmond disposición servicios gratuitos de asistencia lingüística. Llame al [phone].    We comply with applicable federal civil rights laws and [place] laws. We do not discriminate on the basis of race, color, national origin, age, disability, sex, sexual orientation, or gender identity.            Thank you!     Thank you for choosing [facility]  for your care. Our goal is always to provide you with excellent care. Hearing back from our patients is one way we can continue to improve our services. Please take a few minutes to complete the written survey that you may receive in the mail after your visit with us. Thank you!             Your Updated Medication List - Protect others around you: Learn how to safely use, store and throw away your medicines at www.disposemymeds.org.          This list is accurate as of 5/4/18  9:27 AM.  Always use your most recent med list.                   Brand Name Dispense Instructions for use Diagnosis    acyclovir 400 MG tablet    ZOVIRAX    15 tablet    Take 1 tablet (400 mg) by  mouth 3 times daily    Recurrent cold sores       methylphenidate ER 54 MG CR tablet    CONCERTA    30 tablet    Take 1 tablet (54 mg) by mouth every morning    Attention deficit disorder (ADD) without hyperactivity

## 2018-05-04 NOTE — NURSING NOTE
"Chief Complaint   Patient presents with     Recheck Medication       Initial /78 (BP Location: Right arm, Patient Position: Sitting, Cuff Size: Adult Large)  Pulse 65  Temp 97.5  F (36.4  C) (Oral)  Wt 253 lb 1.6 oz (114.8 kg)  SpO2 98%  BMI 36.84 kg/m2 Estimated body mass index is 36.84 kg/(m^2) as calculated from the following:    Height as of 5/23/17: 5' 9.5\" (1.765 m).    Weight as of this encounter: 253 lb 1.6 oz (114.8 kg).  Medication Reconciliation: complete  Joni Cordero, SRINI    "

## 2018-06-18 ENCOUNTER — MYC REFILL (OUTPATIENT)
Dept: PEDIATRICS | Facility: CLINIC | Age: 33
End: 2018-06-18

## 2018-06-18 DIAGNOSIS — F98.8 ATTENTION DEFICIT DISORDER (ADD) WITHOUT HYPERACTIVITY: ICD-10-CM

## 2018-06-18 RX ORDER — METHYLPHENIDATE HYDROCHLORIDE 54 MG/1
54 TABLET ORAL EVERY MORNING
Qty: 30 TABLET | Refills: 0 | Status: SHIPPED | OUTPATIENT
Start: 2018-06-18 | End: 2018-07-18

## 2018-06-18 NOTE — TELEPHONE ENCOUNTER
Message from [employer]:  Original authorizing provider: MD Tu Pham would like a refill of the following medications:  methylphenidate ER (CONCERTA) 54 MG CR tablet [López Tristan MD]    Preferred pharmacy: [facility] [address], [address]    Comment:

## 2018-06-19 NOTE — TELEPHONE ENCOUNTER
Rx tubed to onsite pharmacy. Sent mychart to inform patient.  Joni Cordero, CMA     Universal Safety Inter[employer]

## 2018-07-18 ENCOUNTER — MYC REFILL (OUTPATIENT)
Dept: PEDIATRICS | Facility: CLINIC | Age: 33
End: 2018-07-18

## 2018-07-18 DIAGNOSIS — F98.8 ATTENTION DEFICIT DISORDER (ADD) WITHOUT HYPERACTIVITY: ICD-10-CM

## 2018-07-18 RX ORDER — METHYLPHENIDATE HYDROCHLORIDE 54 MG/1
54 TABLET ORAL EVERY MORNING
Qty: 30 TABLET | Refills: 0 | Status: SHIPPED | OUTPATIENT
Start: 2018-07-18 | End: 2018-08-23

## 2018-07-18 NOTE — TELEPHONE ENCOUNTER
OK to fill   Please inform patient, refill/prescriptioni approved. Please drop off prescription at pharmacy.   Please provider there sign off   Thanks

## 2018-07-18 NOTE — TELEPHONE ENCOUNTER
Routing refill request to provider for review/approval because:  Drug not on the [facility] refill protocol     methylphenidate ER (CONCERTA) 54 MG CR tablet 30 tablet 0 6/18/2018  No   Sig - Route: Take 1 tablet (54 mg) by mouth every morning - Oral   Class: Local Print       Last OV with Dr. Tristan: 54/2018    No future apts.     Giovanna Hayden RN

## 2018-07-18 NOTE — TELEPHONE ENCOUNTER
Message from [employer]:  Original authorizing provider: TIGIST Fleming CNP would like a refill of the following medications:  methylphenidate ER (CONCERTA) 54 MG CR tablet [TIGIST Fleming CNP]    Preferred pharmacy: [facility] [address], [address]    Comment:

## 2018-08-23 ENCOUNTER — MYC REFILL (OUTPATIENT)
Dept: PEDIATRICS | Facility: CLINIC | Age: 33
End: 2018-08-23

## 2018-08-23 DIAGNOSIS — F98.8 ATTENTION DEFICIT DISORDER (ADD) WITHOUT HYPERACTIVITY: ICD-10-CM

## 2018-08-24 RX ORDER — METHYLPHENIDATE HYDROCHLORIDE 54 MG/1
54 TABLET ORAL EVERY MORNING
Qty: 30 TABLET | Refills: 0 | Status: SHIPPED | OUTPATIENT
Start: 2018-08-24 | End: 2018-09-26

## 2018-08-24 NOTE — TELEPHONE ENCOUNTER
Message from [email or web address]:  Original authorizing provider: Maynor Huynh MD PhD    uT Chaney would like a refill of the following medications:  methylphenidate ER (CONCERTA) 54 MG CR tablet [Maynor Huynh MD PhD]    Preferred pharmacy: [facility] [address], [address]    Comment:

## 2018-08-24 NOTE — TELEPHONE ENCOUNTER
Routing refill request to provider for review/approval because:  Drug not on the [facility] refill protocol     methylphenidate ER (CONCERTA) 54 MG CR tablet 30 tablet 0 7/18/2018  No   Sig - Route: Take 1 tablet (54 mg) by mouth every morning - Oral     Last OV with Dr. Tristan: 5/4/2018    Next 5 appointments (look out 90 days)     Nov 05, 2018  7:50 AM CST   PHYSICAL with López Tristan MD   [facility]    [address] [place] [id number]   [phone]                Giovanna Hayden RN

## 2018-09-26 ENCOUNTER — MYC REFILL (OUTPATIENT)
Dept: PEDIATRICS | Facility: CLINIC | Age: 33
End: 2018-09-26

## 2018-09-26 DIAGNOSIS — F98.8 ATTENTION DEFICIT DISORDER (ADD) WITHOUT HYPERACTIVITY: ICD-10-CM

## 2018-09-26 RX ORDER — METHYLPHENIDATE HYDROCHLORIDE 54 MG/1
54 TABLET ORAL EVERY MORNING
Qty: 30 TABLET | Refills: 0 | Status: SHIPPED | OUTPATIENT
Start: 2018-09-26 | End: 2019-06-04

## 2018-09-26 NOTE — TELEPHONE ENCOUNTER
Routing refill request to provider for review/approval because:  Drug not on the [facility] refill protocol     methylphenidate ER (CONCERTA) 54 MG CR tablet 30 tablet 0 8/24/2018  No   Sig - Route: Take 1 tablet (54 mg) by mouth every morning - Oral       Last OV with Dr. rTistan: 5/4/18    Next 5 appointments (look out 90 days)     Nov 05, 2018  7:50 AM CST   PHYSICAL with López Tristan MD   [facility]    [address] [place] [id number]   [phone]                Giovanna Hayden RN

## 2018-09-26 NOTE — TELEPHONE ENCOUNTER
Message from [employer]:  Original authorizing provider: MD Tu Pham would like a refill of the following medications:  methylphenidate ER (CONCERTA) 54 MG CR tablet [López Tristan MD]    Preferred pharmacy: [facility] [address], [address]    Comment:

## 2018-09-26 NOTE — TELEPHONE ENCOUNTER
Rx tubed to onsite pharmacy at 12:12 pm on 9/26/18. Patient notified via [employer] message.     Giovanna Hayden RN

## 2018-10-26 DIAGNOSIS — Z13.6 CARDIOVASCULAR SCREENING; LDL GOAL LESS THAN 160: Primary | ICD-10-CM

## 2018-10-26 DIAGNOSIS — R73.01 ELEVATED FASTING GLUCOSE: ICD-10-CM

## 2018-10-26 DIAGNOSIS — E78.00 HYPERCHOLESTEREMIA: ICD-10-CM

## 2018-11-05 ENCOUNTER — OFFICE VISIT (OUTPATIENT)
Dept: PEDIATRICS | Facility: CLINIC | Age: 33
End: 2018-11-05
Payer: COMMERCIAL

## 2018-11-05 VITALS
BODY MASS INDEX: 35.85 KG/M2 | OXYGEN SATURATION: 98 % | HEART RATE: 60 BPM | SYSTOLIC BLOOD PRESSURE: 133 MMHG | DIASTOLIC BLOOD PRESSURE: 83 MMHG | HEIGHT: 70 IN | TEMPERATURE: 98.6 F | WEIGHT: 250.4 LBS

## 2018-11-05 DIAGNOSIS — E78.00 HYPERCHOLESTEREMIA: ICD-10-CM

## 2018-11-05 DIAGNOSIS — F98.8 ATTENTION DEFICIT DISORDER (ADD) WITHOUT HYPERACTIVITY: ICD-10-CM

## 2018-11-05 DIAGNOSIS — B00.1 RECURRENT COLD SORES: ICD-10-CM

## 2018-11-05 DIAGNOSIS — Z13.6 CARDIOVASCULAR SCREENING; LDL GOAL LESS THAN 160: ICD-10-CM

## 2018-11-05 DIAGNOSIS — E66.9 OBESITY (BMI 30-39.9): ICD-10-CM

## 2018-11-05 DIAGNOSIS — R73.01 ELEVATED FASTING GLUCOSE: ICD-10-CM

## 2018-11-05 DIAGNOSIS — Z00.00 ROUTINE GENERAL MEDICAL EXAMINATION AT A HEALTH CARE FACILITY: Primary | ICD-10-CM

## 2018-11-05 LAB
CHOLEST SERPL-MCNC: 201 MG/DL
GLUCOSE SERPL-MCNC: 104 MG/DL (ref 70–99)
HBA1C MFR BLD: 5.1 % (ref 0–5.6)
HDLC SERPL-MCNC: 34 MG/DL
LDLC SERPL CALC-MCNC: 142 MG/DL
NONHDLC SERPL-MCNC: 167 MG/DL
TRIGL SERPL-MCNC: 124 MG/DL

## 2018-11-05 PROCEDURE — 99395 PREV VISIT EST AGE 18-39: CPT | Performed by: FAMILY MEDICINE

## 2018-11-05 PROCEDURE — 82947 ASSAY GLUCOSE BLOOD QUANT: CPT | Performed by: FAMILY MEDICINE

## 2018-11-05 PROCEDURE — 83036 HEMOGLOBIN GLYCOSYLATED A1C: CPT | Performed by: FAMILY MEDICINE

## 2018-11-05 PROCEDURE — 36415 COLL VENOUS BLD VENIPUNCTURE: CPT | Performed by: FAMILY MEDICINE

## 2018-11-05 PROCEDURE — 80061 LIPID PANEL: CPT | Performed by: FAMILY MEDICINE

## 2018-11-05 RX ORDER — METHYLPHENIDATE HYDROCHLORIDE 54 MG/1
54 TABLET ORAL EVERY MORNING
Qty: 30 TABLET | Refills: 0 | Status: CANCELLED | OUTPATIENT
Start: 2018-11-05

## 2018-11-05 RX ORDER — METHYLPHENIDATE HYDROCHLORIDE 54 MG/1
54 TABLET ORAL DAILY
Qty: 30 TABLET | Refills: 0 | Status: SHIPPED | OUTPATIENT
Start: 2018-11-05 | End: 2019-06-04

## 2018-11-05 RX ORDER — METHYLPHENIDATE HYDROCHLORIDE 54 MG/1
54 TABLET ORAL DAILY
Qty: 30 TABLET | Refills: 0 | Status: SHIPPED | OUTPATIENT
Start: 2018-12-06 | End: 2019-06-04

## 2018-11-05 RX ORDER — METHYLPHENIDATE HYDROCHLORIDE 54 MG/1
54 TABLET ORAL DAILY
Qty: 30 TABLET | Refills: 0 | Status: SHIPPED | OUTPATIENT
Start: 2019-01-06 | End: 2019-06-04

## 2018-11-05 ASSESSMENT — PAIN SCALES - GENERAL: PAINLEVEL: NO PAIN (0)

## 2018-11-05 NOTE — PATIENT INSTRUCTIONS
Schedule for med recheck in 6 months      Preventive Health Recommendations  Male Ages 26 - 39    Yearly exam:             See your health care provider every year in order to  o   Review health changes.   o   Discuss preventive care.    o   Review your medicines if your doctor has prescribed any.    You should be tested each year for STDs (sexually transmitted diseases), if you re at risk.     After age 35, talk to your provider about cholesterol testing. If you are at risk for heart disease, have your cholesterol tested at least every 5 years.     If you are at risk for diabetes, you should have a diabetes test (fasting glucose).  Shots: Get a flu shot each year. Get a tetanus shot every 10 years.     Nutrition:    Eat at least 5 servings of fruits and vegetables daily.     Eat whole-grain bread, whole-wheat pasta and brown rice instead of white grains and rice.     Get adequate Calcium and Vitamin D.     Lifestyle    Exercise for at least 150 minutes a week (30 minutes a day, 5 days a week). This will help you control your weight and prevent disease.     Limit alcohol to one drink per day.     No smoking.     Wear sunscreen to prevent skin cancer.     See your dentist every six months for an exam and cleaning.

## 2018-11-05 NOTE — MR AVS SNAPSHOT
After Visit Summary   11/5/2018    Tu Chaney    MRN: [id number]           Patient Information     Date Of Birth          1985        Visit Information        Provider Department      11/5/2018 7:50 AM López Tristan MD [facility]        Today's Diagnoses     Routine general medical examination at a health care facility    -  1    Attention deficit disorder (ADD) without hyperactivity          Care Instructions    Schedule for med recheck in 6 months      Preventive Health Recommendations  Male Ages 26 - 39    Yearly exam:             See your health care provider every year in order to  o   Review health changes.   o   Discuss preventive care.    o   Review your medicines if your doctor has prescribed any.    You should be tested each year for STDs (sexually transmitted diseases), if you re at risk.     After age 35, talk to your provider about cholesterol testing. If you are at risk for heart disease, have your cholesterol tested at least every 5 years.     If you are at risk for diabetes, you should have a diabetes test (fasting glucose).  Shots: Get a flu shot each year. Get a tetanus shot every 10 years.     Nutrition:    Eat at least 5 servings of fruits and vegetables daily.     Eat whole-grain bread, whole-wheat pasta and brown rice instead of white grains and rice.     Get adequate Calcium and Vitamin D.     Lifestyle    Exercise for at least 150 minutes a week (30 minutes a day, 5 days a week). This will help you control your weight and prevent disease.     Limit alcohol to one drink per day.     No smoking.     Wear sunscreen to prevent skin cancer.     See your dentist every six months for an exam and cleaning.             Follow-ups after your visit        Who to contact     If you have questions or need follow up information about today's clinic visit or your schedule please contact [facility] directly at [phone].  Normal or  "non-critical lab and imaging results will be communicated to you by MyChart, letter or phone within 4 business days after the clinic has received the results. If you do not hear from us within 7 days, please contact the clinic through [employer] or phone. If you have a critical or abnormal lab result, we will notify you by phone as soon as possible.  Submit refill requests through [employer] or call your pharmacy and they will forward the refill request to us. Please allow 3 business days for your refill to be completed.          Additional Information About Your Visit        [employer] Information     [employer] gives you secure access to your electronic health record. If you see a primary care provider, you can also send messages to your care team and make appointments. If you have questions, please call your primary care clinic.  If you do not have a primary care provider, please call [phone] and they will assist you.      [employer] is an electronic gateway that provides easy, online access to your medical records. With [employer], you can request a clinic appointment, read your test results, renew a prescription or communicate with your care team.     To access your existing account, please contact your [facility] or call [phone] for assistance.        Care EveryWhere ID     This is your Care EveryWhere ID. This could be used by other organizations to access your [place] medical records  [id number]        Your Vitals Were     Pulse Temperature Height Pulse Oximetry BMI (Body Mass Index)       60 98.6  F (37  C) (Oral) 5' 9.75\" (1.772 m) 98% 36.19 kg/m2        Blood Pressure from Last 3 Encounters:   11/05/18 133/83   05/04/18 114/78   11/14/17 132/80    Weight from Last 3 Encounters:   11/05/18 250 lb 6.4 oz (113.6 kg)   05/04/18 253 lb 1.6 oz (114.8 kg)   11/14/17 252 lb 12.8 oz (114.7 kg)              Today, you had the following     No orders found for display         Today's " Medication Changes          These changes are accurate as of 11/5/18  8:00 AM.  If you have any questions, ask your nurse or doctor.               These medicines have changed or have updated prescriptions.        Dose/Directions    * methylphenidate ER 54 MG CR tablet   Commonly known as:  CONCERTA   This may have changed:  Another medication with the same name was added. Make sure you understand how and when to take each.   Used for:  Attention deficit disorder (ADD) without hyperactivity   Changed by:  López Tristan MD        Dose:  54 mg   Take 1 tablet (54 mg) by mouth every morning   Quantity:  30 tablet   Refills:  0       * methylphenidate ER 54 MG CR tablet   Commonly known as:  CONCERTA   This may have changed:  You were already taking a medication with the same name, and this prescription was added. Make sure you understand how and when to take each.   Used for:  Attention deficit disorder (ADD) without hyperactivity   Changed by:  López Tristan MD        Dose:  54 mg   Take 1 tablet (54 mg) by mouth daily   Quantity:  30 tablet   Refills:  0       * methylphenidate ER 54 MG CR tablet   Commonly known as:  CONCERTA   This may have changed:  You were already taking a medication with the same name, and this prescription was added. Make sure you understand how and when to take each.   Used for:  Attention deficit disorder (ADD) without hyperactivity   Changed by:  López Tristan MD        Dose:  54 mg   Start taking on:  12/6/2018   Take 1 tablet (54 mg) by mouth daily   Quantity:  30 tablet   Refills:  0       * methylphenidate ER 54 MG CR tablet   Commonly known as:  CONCERTA   This may have changed:  You were already taking a medication with the same name, and this prescription was added. Make sure you understand how and when to take each.   Used for:  Attention deficit disorder (ADD) without hyperactivity   Changed by:  López Tristan MD        Dose:  54 mg   Start taking on:   1/6/2019   Take 1 tablet (54 mg) by mouth daily   Quantity:  30 tablet   Refills:  0       * Notice:  This list has 4 medication(s) that are the same as other medications prescribed for you. Read the directions carefully, and ask your doctor or other care provider to review them with you.         Where to get your medicines      Some of these will need a paper prescription and others can be bought over the counter.  Ask your nurse if you have questions.     Bring a paper prescription for each of these medications     methylphenidate ER 54 MG CR tablet    methylphenidate ER 54 MG CR tablet    methylphenidate ER 54 MG CR tablet                Primary Care Provider Office Phone # Fax #    López Tristan [phone] [address]  [facility] [address]        Equal Access to Services     KEON APARICIO : Ashok geller Solinnea, waaxgatito luqadaha, qaybta kaalmada dilma, elyssa landis . So [facility] [phone].    ATENCIÓN: Si habla español, tiene a richmond disposición servicios gratuitos de asistencia lingüística. Llame al [phone].    We comply with applicable federal civil rights laws and [place] laws. We do not discriminate on the basis of race, color, national origin, age, disability, sex, sexual orientation, or gender identity.            Thank you!     Thank you for choosing [facility]  for your care. Our goal is always to provide you with excellent care. Hearing back from our patients is one way we can continue to improve our services. Please take a few minutes to complete the written survey that you may receive in the mail after your visit with us. Thank you!             Your Updated Medication List - Protect others around you: Learn how to safely use, store and throw away your medicines at www.disposemymeds.org.          This list is accurate as of 11/5/18  8:00 AM.  Always use your most recent med list.                   Brand Name  Dispense Instructions for use Diagnosis    acyclovir 400 MG tablet    ZOVIRAX    15 tablet    Take 1 tablet (400 mg) by mouth 3 times daily    Recurrent cold sores       * methylphenidate ER 54 MG CR tablet    CONCERTA    30 tablet    Take 1 tablet (54 mg) by mouth every morning    Attention deficit disorder (ADD) without hyperactivity       * methylphenidate ER 54 MG CR tablet    CONCERTA    30 tablet    Take 1 tablet (54 mg) by mouth daily    Attention deficit disorder (ADD) without hyperactivity       * methylphenidate ER 54 MG CR tablet   Start taking on:  12/6/2018    CONCERTA    30 tablet    Take 1 tablet (54 mg) by mouth daily    Attention deficit disorder (ADD) without hyperactivity       * methylphenidate ER 54 MG CR tablet   Start taking on:  1/6/2019    CONCERTA    30 tablet    Take 1 tablet (54 mg) by mouth daily    Attention deficit disorder (ADD) without hyperactivity       * Notice:  This list has 4 medication(s) that are the same as other medications prescribed for you. Read the directions carefully, and ask your doctor or other care provider to review them with you.

## 2018-11-05 NOTE — PROGRESS NOTES
SUBJECTIVE:   CC: Tu Chaney is an 33 year old male who presents for preventative health visit.     Healthy Habits:    Do you get at least three servings of calcium containing foods daily (dairy, green leafy vegetables, etc.)? yes    Amount of exercise or daily activities, outside of work: 0    Problems taking medications regularly No    Medication side effects: No    Have you had an eye exam in the past two years? no    Do you see a dentist twice per year? no    Do you have sleep apnea, excessive snoring or daytime drowsiness?no           Today's PHQ-2 Score:   PHQ-2 ( 1999 [employer]) 11/5/2018 5/23/2017   Q1: Little interest or pleasure in doing things 0 0   Q2: Feeling down, depressed or hopeless 0 0   PHQ-2 Score 0 0       Abuse: Current or Past(Physical, Sexual or Emotional)- No  Do you feel safe in your environment - Yes    Social History   Substance Use Topics     Smoking status: Never Smoker     Smokeless tobacco: Never Used      Comment: no passive exposure     Alcohol use Yes      Comment: occ      If you drink alcohol do you typically have >3 drinks per day or >7 drinks per week? No                      Last PSA: No results found for: PSA    Reviewed orders with patient. Reviewed health maintenance and updated orders accordingly - Yes  Labs reviewed in EPIC  BP Readings from Last 3 Encounters:   11/05/18 133/83   05/04/18 114/78   11/14/17 132/80    Wt Readings from Last 3 Encounters:   11/05/18 250 lb 6.4 oz (113.6 kg)   05/04/18 253 lb 1.6 oz (114.8 kg)   11/14/17 252 lb 12.8 oz (114.7 kg)                  Patient Active Problem List   Diagnosis     CARDIOVASCULAR SCREENING; LDL GOAL LESS THAN 160     Recurrent cold sores     Obesity (BMI 30-39.9)     Hypercholesteremia     Elevated fasting glucose     Vasovagal near-syncope     Elevated blood pressure reading without diagnosis of hypertension     Attention deficit disorder (ADD) without hyperactivity     Past Surgical History:   Procedure Laterality  Date     TONSILLECTOMY         Social History   Substance Use Topics     Smoking status: Never Smoker     Smokeless tobacco: Never Used      Comment: no passive exposure     Alcohol use Yes      Comment: occ     History reviewed. No pertinent family history.      Current Outpatient Prescriptions   Medication Sig Dispense Refill     methylphenidate ER (CONCERTA) 54 MG CR tablet Take 1 tablet (54 mg) by mouth daily 30 tablet 0     [START ON 12/6/2018] methylphenidate ER (CONCERTA) 54 MG CR tablet Take 1 tablet (54 mg) by mouth daily 30 tablet 0     [START ON 1/6/2019] methylphenidate ER (CONCERTA) 54 MG CR tablet Take 1 tablet (54 mg) by mouth daily 30 tablet 0     acyclovir (ZOVIRAX) 400 MG tablet Take 1 tablet (400 mg) by mouth 3 times daily 15 tablet 1     methylphenidate ER (CONCERTA) 54 MG CR tablet Take 1 tablet (54 mg) by mouth every morning (Patient not taking: Reported on 11/5/2018) 30 tablet 0     No Known Allergies  Recent Labs   Lab Test  11/05/18   0727  05/04/18   0849  05/23/17   0933   A1C  5.1   --   5.0   LDL  142*  135*  134*   HDL  34*  36*  45   TRIG  124  92  77   ALT   --   50  65   CR   --   1.00  0.92   GFRESTIMATED   --   87  >90  Non  GFR Calc     GFRESTBLACK   --   >90  >90  African American GFR Calc     POTASSIUM   --   4.0  4.0   TSH   --    --   1.25        Reviewed and updated as needed this visit by clinical staff  Tobacco  Allergies  Meds  Med Hx  Surg Hx  Fam Hx  Soc Hx        Reviewed and updated as needed this visit by Provider          Past Medical History:   Diagnosis Date     adhd       Past Surgical History:   Procedure Laterality Date     TONSILLECTOMY              ROS:  CONSTITUTIONAL: NEGATIVE for fever, chills, change in weight  INTEGUMENTARY/SKIN: NEGATIVE for worrisome rashes, moles or lesions  EYES: NEGATIVE for vision changes or irritation  ENT: NEGATIVE for ear, mouth and throat problems  RESP: NEGATIVE for significant cough or SOB  CV: NEGATIVE  "for chest pain, palpitations or peripheral edema  GI: NEGATIVE for nausea, abdominal pain, heartburn, or change in bowel habits   male: negative for dysuria, hematuria, decreased urinary stream, erectile dysfunction, urethral discharge  MUSCULOSKELETAL: NEGATIVE for significant arthralgias or myalgia  NEURO: NEGATIVE for weakness, dizziness or paresthesias  ENDOCRINE: NEGATIVE for temperature intolerance, skin/hair changes  HEME/ALLERGY/IMMUNE: NEGATIVE for bleeding problems  PSYCHIATRIC: NEGATIVE for changes in mood or affect  PSYCHIATRIC: History of ADHD    OBJECTIVE:   /83 (BP Location: Right arm, Patient Position: Sitting, Cuff Size: Adult Large)  Pulse 60  Temp 98.6  F (37  C) (Oral)  Ht 5' 9.75\" (1.772 m)  Wt 250 lb 6.4 oz (113.6 kg)  SpO2 98%  BMI 36.19 kg/m2  EXAM:  GENERAL: healthy, alert and no distress  EYES: Eyes grossly normal to inspection, PERRL and conjunctivae and sclerae normal  HENT: ear canals and TM's normal, nose and mouth without ulcers or lesions  NECK: no adenopathy, no asymmetry, masses, or scars and thyroid normal to palpation  RESP: lungs clear to auscultation - no rales, rhonchi or wheezes  CV: regular rate and rhythm, normal S1 S2, no S3 or S4, no murmur, click or rub, no peripheral edema and peripheral pulses strong  ABDOMEN: soft, nontender, no hepatosplenomegaly, no masses and bowel sounds normal  MS: no gross musculoskeletal defects noted, no edema  SKIN: no suspicious lesions or rashes  NEURO: Normal strength and tone, mentation intact and speech normal  PSYCH: mentation appears normal, affect normal/bright    Diagnostic Test Results:  Results for orders placed or performed in visit on 11/05/18 (from the past 24 hour(s))   Glucose   Result Value Ref Range    Glucose 104 (H) 70 - 99 mg/dL   **A1C FUTURE anytime   Result Value Ref Range    Hemoglobin A1C 5.1 0 - 5.6 %   Lipid panel reflex to direct LDL Fasting   Result Value Ref Range    Cholesterol 201 (H) <200 mg/dL "    Triglycerides 124 <150 mg/dL    HDL Cholesterol 34 (L) >39 mg/dL    LDL Cholesterol Calculated 142 (H) <100 mg/dL    Non HDL Cholesterol 167 (H) <130 mg/dL       ASSESSMENT/PLAN:   1. Routine general medical examination at a health care facility  : Discussed on regular exercises, healthy eating, self testicular exams  and routine dental checks.    2. Attention deficit disorder (ADD) without hyperactivity  .  3-month supply given on medications.    Stable, continue with Concerta 54 mg daily, follow for recheck in 6 months  - methylphenidate ER (CONCERTA) 54 MG CR tablet; Take 1 tablet (54 mg) by mouth daily  Dispense: 30 tablet; Refill: 0  - methylphenidate ER (CONCERTA) 54 MG CR tablet; Take 1 tablet (54 mg) by mouth daily  Dispense: 30 tablet; Refill: 0  - methylphenidate ER (CONCERTA) 54 MG CR tablet; Take 1 tablet (54 mg) by mouth daily  Dispense: 30 tablet; Refill: 0    3. Elevated fasting glucose  Lab Results   Component Value Date    A1C 5.1 11/05/2018    A1C 5.0 05/23/2017     Glucose   Date Value Ref Range Status   11/05/2018 104 (H) 70 - 99 mg/dL Final     Comment:     Fasting specimen   05/04/2018 97 70 - 99 mg/dL Final     Comment:     Fasting specimen   05/23/2017 91 70 - 99 mg/dL Final     Comment:     Fasting specimen   09/23/2014 117 (H) 70 - 99 mg/dL Final     Comment:     Effective 7/30/2014, the reference range for this assay has changed to reflect   new instrumentation/methodology.       Review slight elevated fasting blood sugar but normal A1c.  Emphasized on weight loss, regular exercises, healthy eating, portion control.    4. Obesity (BMI 30-39.9)  Wt Readings from Last 5 Encounters:   11/05/18 250 lb 6.4 oz (113.6 kg)   05/04/18 253 lb 1.6 oz (114.8 kg)   11/14/17 252 lb 12.8 oz (114.7 kg)   05/23/17 246 lb 8 oz (111.8 kg)   10/31/16 244 lb 11.2 oz (111 kg)     Emphasized on weight loss, portion control, low calorie and low fat diet, healthy eating, regular exercises.      5.  "CARDIOVASCULAR SCREENING; LDL GOAL LESS THAN 160  LDL Cholesterol Calculated   Date Value Ref Range Status   11/05/2018 142 (H) <100 mg/dL Final     Comment:     Above desirable:  100-129 mg/dl  Borderline High:  130-159 mg/dL  High:             160-189 mg/dL  Very high:       >189 mg/dl           6. Recurrent cold sores  Stable, patient has not had any episode in the past year, continue to monitor    7. Hypercholesteremia  Lab Results   Component Value Date    CHOL 201 11/05/2018     Lab Results   Component Value Date    HDL 34 11/05/2018     Lab Results   Component Value Date     11/05/2018     Lab Results   Component Value Date    TRIG 124 11/05/2018     Lab Results   Component Value Date    CHOLHDLRATIO 4.8 09/23/2014     Reviewed moderately elevated fasting cholesterol numbers, reviewed healthy and low-fat diet, portion control, weight loss, regular exercises.  Recheck in 1 year.      COUNSELING:  Reviewed preventive health counseling, as reflected in patient instructions  Special attention given to:        Regular exercise       Healthy diet/nutrition       Vision screening       Immunizations    Declined: Influenza due to Other             BP Readings from Last 1 Encounters:   11/05/18 133/83     Estimated body mass index is 36.19 kg/(m^2) as calculated from the following:    Height as of this encounter: 5' 9.75\" (1.772 m).    Weight as of this encounter: 250 lb 6.4 oz (113.6 kg).    BP Screening:   Last 3 BP Readings:    BP Readings from Last 3 Encounters:   11/05/18 133/83   05/04/18 114/78   11/14/17 132/80       The following was recommended to the patient:  Re-screen BP within a year and recommended lifestyle modifications  Weight management plan: Discussed healthy diet and exercise guidelines and patient will follow up in 6 months in clinic to re-evaluate.     reports that he has never smoked. He has never used smokeless tobacco.      Counseling Resources:  ATP IV Guidelines  Pooled Cohorts " Equation Calculator  FRAX Risk Assessment  ICSI Preventive Guidelines  Dietary Guidelines for Americans, 2010  USDA's MyPlate  ASA Prophylaxis  Lung CA Screening    López Tristan MD  [facility]  Chart documentation done in part with Dragon Voice recognition Software. Although reviewed after completion, some word and grammatical error may remain.

## 2019-02-14 ENCOUNTER — MYC REFILL (OUTPATIENT)
Dept: PEDIATRICS | Facility: CLINIC | Age: 34
End: 2019-02-14

## 2019-02-14 DIAGNOSIS — F98.8 ATTENTION DEFICIT DISORDER (ADD) WITHOUT HYPERACTIVITY: ICD-10-CM

## 2019-02-14 RX ORDER — METHYLPHENIDATE HYDROCHLORIDE 54 MG/1
54 TABLET ORAL DAILY
Qty: 30 TABLET | Refills: 0 | Status: CANCELLED | OUTPATIENT
Start: 2019-02-14

## 2019-02-14 NOTE — TELEPHONE ENCOUNTER
Concerta      Last Written Prescription Date:  1/6/19  Last Fill Quantity: 30,   # refills: 0  Last Office Visit: 11/5/2018  Future Office visit:       Routing refill request to provider for review/approval because:  Drug not on the [facility], [facility] or [facility] refill protocol or controlled substance

## 2019-02-15 RX ORDER — METHYLPHENIDATE HYDROCHLORIDE 54 MG/1
54 TABLET ORAL DAILY
Qty: 30 TABLET | Refills: 0 | Status: SHIPPED | OUTPATIENT
Start: 2019-02-15 | End: 2019-05-30

## 2019-02-15 RX ORDER — METHYLPHENIDATE HYDROCHLORIDE 54 MG/1
54 TABLET ORAL DAILY
Qty: 30 TABLET | Refills: 0 | Status: SHIPPED | OUTPATIENT
Start: 2019-04-18 | End: 2019-06-04

## 2019-02-15 RX ORDER — METHYLPHENIDATE HYDROCHLORIDE 54 MG/1
54 TABLET ORAL DAILY
Qty: 30 TABLET | Refills: 0 | Status: SHIPPED | OUTPATIENT
Start: 2019-03-18 | End: 2019-06-04

## 2019-02-15 NOTE — TELEPHONE ENCOUNTER
3 prescription for methylphenidate (CONCERTA) 54 MG CR tablet tubed to onsite [facility] pharmacy. Patient notified via [employer]. Carolyn CASTILLO CMA

## 2019-03-18 ENCOUNTER — MYC REFILL (OUTPATIENT)
Dept: PEDIATRICS | Facility: CLINIC | Age: 34
End: 2019-03-18

## 2019-03-18 DIAGNOSIS — F98.8 ATTENTION DEFICIT DISORDER (ADD) WITHOUT HYPERACTIVITY: ICD-10-CM

## 2019-03-18 RX ORDER — METHYLPHENIDATE HYDROCHLORIDE 54 MG/1
54 TABLET ORAL DAILY
Qty: 30 TABLET | Refills: 0 | Status: CANCELLED | OUTPATIENT
Start: 2019-03-18

## 2019-03-19 NOTE — TELEPHONE ENCOUNTER
Per chart review, 3 scripts for this med were tubed to onsite pharmacy. Patient should have refills available. Patient notified via mychart.    Bailee Acosta RN   .[facility]

## 2019-05-30 ENCOUNTER — MYC REFILL (OUTPATIENT)
Dept: PEDIATRICS | Facility: CLINIC | Age: 34
End: 2019-05-30

## 2019-05-30 DIAGNOSIS — F98.8 ATTENTION DEFICIT DISORDER (ADD) WITHOUT HYPERACTIVITY: ICD-10-CM

## 2019-05-30 NOTE — TELEPHONE ENCOUNTER
Methylphenidate- 54 mg CR tablet    Routing refill request to provider for review/approval because:  Drug not on the [facility] refill protocol     Last Written Prescription Date:  4/18/19  Last Fill Quantity: 30,  # refills: 0   Last office visit: 11/5/2018 with prescribing provider:  Dr. Tristan   Future Office Visit:          Jeanne Luis RN, [facility]

## 2019-05-31 RX ORDER — METHYLPHENIDATE HYDROCHLORIDE 54 MG/1
54 TABLET ORAL DAILY
Qty: 30 TABLET | Refills: 0 | Status: SHIPPED | OUTPATIENT
Start: 2019-05-31 | End: 2021-08-13

## 2019-06-04 ENCOUNTER — OFFICE VISIT (OUTPATIENT)
Dept: PEDIATRICS | Facility: CLINIC | Age: 34
End: 2019-06-04
Payer: COMMERCIAL

## 2019-06-04 VITALS
HEART RATE: 77 BPM | HEIGHT: 70 IN | TEMPERATURE: 98.3 F | BODY MASS INDEX: 36.54 KG/M2 | SYSTOLIC BLOOD PRESSURE: 137 MMHG | OXYGEN SATURATION: 99 % | WEIGHT: 255.2 LBS | DIASTOLIC BLOOD PRESSURE: 81 MMHG

## 2019-06-04 DIAGNOSIS — F98.8 ATTENTION DEFICIT DISORDER (ADD) WITHOUT HYPERACTIVITY: ICD-10-CM

## 2019-06-04 PROCEDURE — 99213 OFFICE O/P EST LOW 20 MIN: CPT | Performed by: FAMILY MEDICINE

## 2019-06-04 RX ORDER — METHYLPHENIDATE HYDROCHLORIDE 54 MG/1
54 TABLET ORAL DAILY
Qty: 30 TABLET | Refills: 0 | Status: SHIPPED | OUTPATIENT
Start: 2019-08-05 | End: 2019-12-06

## 2019-06-04 RX ORDER — METHYLPHENIDATE HYDROCHLORIDE 54 MG/1
54 TABLET ORAL DAILY
Qty: 30 TABLET | Refills: 0 | Status: SHIPPED | OUTPATIENT
Start: 2019-07-05 | End: 2019-12-06

## 2019-06-04 RX ORDER — METHYLPHENIDATE HYDROCHLORIDE 54 MG/1
54 TABLET ORAL DAILY
Qty: 30 TABLET | Refills: 0 | Status: SHIPPED | OUTPATIENT
Start: 2019-06-04 | End: 2019-12-06

## 2019-06-04 RX ORDER — METHYLPHENIDATE HYDROCHLORIDE 54 MG/1
54 TABLET ORAL DAILY
Qty: 30 TABLET | Refills: 0 | Status: CANCELLED | OUTPATIENT
Start: 2019-06-04

## 2019-06-04 ASSESSMENT — PAIN SCALES - GENERAL: PAINLEVEL: NO PAIN (0)

## 2019-06-04 ASSESSMENT — MIFFLIN-ST. JEOR: SCORE: 2109.86

## 2019-06-04 NOTE — PROGRESS NOTES
Subjective     Tu Chaney is a 34 year old male who presents to clinic today for the following health issues:    HPI   Medication Followup of methylphenidate (CONCERTA) 54 MG CR tablet for Attention deficit disorder (ADD) without hyperactivity    Taking Medication as prescribed: yes    Side Effects:  None    Medication Helping Symptoms:  yes         Patient Active Problem List   Diagnosis     CARDIOVASCULAR SCREENING; LDL GOAL LESS THAN 160     Recurrent cold sores     Obesity (BMI 30-39.9)     Hypercholesteremia     Elevated fasting glucose     Vasovagal near-syncope     Elevated blood pressure reading without diagnosis of hypertension     Attention deficit disorder (ADD) without hyperactivity     Past Surgical History:   Procedure Laterality Date     TONSILLECTOMY         Social History     Tobacco Use     Smoking status: Never Smoker     Smokeless tobacco: Never Used     Tobacco comment: no passive exposure   Substance Use Topics     Alcohol use: Yes     Comment: occ     History reviewed. No pertinent family history.      Current Outpatient Medications   Medication Sig Dispense Refill     methylphenidate (CONCERTA) 54 MG CR tablet Take 1 tablet (54 mg) by mouth daily 30 tablet 0     [START ON 7/5/2019] methylphenidate (CONCERTA) 54 MG CR tablet Take 1 tablet (54 mg) by mouth daily 30 tablet 0     [START ON 8/5/2019] methylphenidate (CONCERTA) 54 MG CR tablet Take 1 tablet (54 mg) by mouth daily 30 tablet 0     methylphenidate (CONCERTA) 54 MG CR tablet Take 1 tablet (54 mg) by mouth daily 30 tablet 0     acyclovir (ZOVIRAX) 400 MG tablet Take 1 tablet (400 mg) by mouth 3 times daily 15 tablet 1     No Known Allergies  Recent Labs   Lab Test 11/05/18  0727 05/04/18  0849 05/23/17  0933   A1C 5.1  --  5.0   * 135* 134*   HDL 34* 36* 45   TRIG 124 92 77   ALT  --  50 65   CR  --  1.00 0.92   GFRESTIMATED  --  87 >90  Non  GFR Calc     GFRESTBLACK  --  >90 >90  African American GFR Calc    "  POTASSIUM  --  4.0 4.0   TSH  --   --  1.25      BP Readings from Last 3 Encounters:   06/04/19 137/81   11/05/18 133/83   05/04/18 114/78    Wt Readings from Last 3 Encounters:   06/04/19 115.8 kg (255 lb 3.2 oz)   11/05/18 113.6 kg (250 lb 6.4 oz)   05/04/18 114.8 kg (253 lb 1.6 oz)                      Reviewed and updated as needed this visit by Provider         Review of Systems   ROS COMP: CONSTITUTIONAL: NEGATIVE for fever, chills, change in weight  RESP: NEGATIVE for significant cough or SOB  CV: NEGATIVE for chest pain, palpitations or peripheral edema  PSYCHIATRIC: as above      Objective    /81 (BP Location: Right arm, Patient Position: Sitting, Cuff Size: Adult Large)   Pulse 77   Temp 98.3  F (36.8  C) (Oral)   Ht 1.772 m (5' 9.75\")   Wt 115.8 kg (255 lb 3.2 oz)   SpO2 99%   BMI 36.88 kg/m    Body mass index is 36.88 kg/m .  Physical Exam   GENERAL: healthy, alert and no distress  RESP: lungs clear to auscultation - no rales, rhonchi or wheezes  CV: regular rate and rhythm, normal S1 S2, no S3 or S4, no murmur, click or rub, no peripheral edema and peripheral pulses strong  PSYCH: mentation appears normal, affect normal/bright    Diagnostic Test Results:  Labs reviewed in Epic        Assessment & Plan     1. Attention deficit disorder (ADD) without hyperactivity  Stable, continue with current dose of Concerta 54 mg, follow-up for recheck in 6 months at the time of physical or sooner if needed  - methylphenidate (CONCERTA) 54 MG CR tablet; Take 1 tablet (54 mg) by mouth daily  Dispense: 30 tablet; Refill: 0  - methylphenidate (CONCERTA) 54 MG CR tablet; Take 1 tablet (54 mg) by mouth daily  Dispense: 30 tablet; Refill: 0  - methylphenidate (CONCERTA) 54 MG CR tablet; Take 1 tablet (54 mg) by mouth daily  Dispense: 30 tablet; Refill: 0     BMI:   Estimated body mass index is 36.88 kg/m  as calculated from the following:    Height as of this encounter: 1.772 m (5' 9.75\").    Weight as of this " encounter: 115.8 kg (255 lb 3.2 oz).   Weight management plan: Discussed healthy diet and exercise guidelines        Work on weight loss  Regular exercise  Chart documentation done in part with Dragon Voice recognition Software. Although reviewed after completion, some word and grammatical error may remain.    See Patient Instructions    No follow-ups on file.    López Tristan MD  [facility]

## 2019-10-25 ENCOUNTER — MYC REFILL (OUTPATIENT)
Dept: PEDIATRICS | Facility: CLINIC | Age: 34
End: 2019-10-25

## 2019-10-25 DIAGNOSIS — F98.8 ATTENTION DEFICIT DISORDER (ADD) WITHOUT HYPERACTIVITY: ICD-10-CM

## 2019-10-25 RX ORDER — METHYLPHENIDATE HYDROCHLORIDE 54 MG/1
54 TABLET ORAL DAILY
Qty: 30 TABLET | Refills: 0 | Status: CANCELLED | OUTPATIENT
Start: 2019-10-25

## 2019-11-08 ENCOUNTER — HEALTH MAINTENANCE LETTER (OUTPATIENT)
Age: 34
End: 2019-11-08

## 2019-12-03 ENCOUNTER — MYC REFILL (OUTPATIENT)
Dept: PEDIATRICS | Facility: CLINIC | Age: 34
End: 2019-12-03

## 2019-12-03 DIAGNOSIS — F98.8 ATTENTION DEFICIT DISORDER (ADD) WITHOUT HYPERACTIVITY: ICD-10-CM

## 2019-12-03 RX ORDER — METHYLPHENIDATE HYDROCHLORIDE 54 MG/1
54 TABLET ORAL DAILY
Qty: 30 TABLET | Refills: 0 | OUTPATIENT
Start: 2019-12-03

## 2019-12-03 NOTE — TELEPHONE ENCOUNTER
methylphenidate (CONCERTA) 54 MG CR tablet  Last Written Prescription Date:  10/25/19  Last Fill Quantity: 30,  # refills: 0   Last office visit: 6/4/2019 with prescribing provider:    Future Office Visit:      Routing refill request to provider for review/approval because:  Drug not on the [facility] refill protocol     Bailee Acosta RN, BSN, PHN  [facility]

## 2019-12-03 NOTE — TELEPHONE ENCOUNTER
Patient is due for medication recheck and physical this month.  Need to be seen before further refills

## 2019-12-06 ENCOUNTER — OFFICE VISIT (OUTPATIENT)
Dept: PEDIATRICS | Facility: CLINIC | Age: 34
End: 2019-12-06
Payer: COMMERCIAL

## 2019-12-06 VITALS
WEIGHT: 259.1 LBS | OXYGEN SATURATION: 99 % | HEART RATE: 54 BPM | SYSTOLIC BLOOD PRESSURE: 138 MMHG | DIASTOLIC BLOOD PRESSURE: 84 MMHG | BODY MASS INDEX: 37.44 KG/M2

## 2019-12-06 DIAGNOSIS — F98.8 ATTENTION DEFICIT DISORDER (ADD) WITHOUT HYPERACTIVITY: Primary | ICD-10-CM

## 2019-12-06 PROCEDURE — 99213 OFFICE O/P EST LOW 20 MIN: CPT | Performed by: FAMILY MEDICINE

## 2019-12-06 RX ORDER — METHYLPHENIDATE HYDROCHLORIDE 54 MG/1
54 TABLET ORAL DAILY
Qty: 30 TABLET | Refills: 0 | Status: SHIPPED | OUTPATIENT
Start: 2019-12-06 | End: 2020-01-05

## 2019-12-06 RX ORDER — METHYLPHENIDATE HYDROCHLORIDE 54 MG/1
54 TABLET ORAL DAILY
Qty: 30 TABLET | Refills: 0 | Status: SHIPPED | OUTPATIENT
Start: 2020-01-06 | End: 2020-02-05

## 2019-12-06 RX ORDER — METHYLPHENIDATE HYDROCHLORIDE 54 MG/1
54 TABLET ORAL DAILY
Qty: 30 TABLET | Refills: 0 | Status: SHIPPED | OUTPATIENT
Start: 2020-02-06 | End: 2020-03-07

## 2019-12-06 NOTE — PROGRESS NOTES
Subjective     Tu Chaney is a 34 year old male who presents to clinic today for the following health issues:    HPI   Medication Followup of  Attention deficit disorder (ADD) without hyperactivity     Taking Medication as prescribed: yes    Side Effects:  None    Medication Helping Symptoms:  yes         Patient Active Problem List   Diagnosis     CARDIOVASCULAR SCREENING; LDL GOAL LESS THAN 160     Recurrent cold sores     Obesity (BMI 30-39.9)     Hypercholesteremia     Elevated fasting glucose     Vasovagal near-syncope     Elevated blood pressure reading without diagnosis of hypertension     Attention deficit disorder (ADD) without hyperactivity     Past Surgical History:   Procedure Laterality Date     TONSILLECTOMY         Social History     Tobacco Use     Smoking status: Never Smoker     Smokeless tobacco: Never Used     Tobacco comment: no passive exposure   Substance Use Topics     Alcohol use: Yes     Comment: occ     No family history on file.      Current Outpatient Medications   Medication Sig Dispense Refill     methylphenidate (CONCERTA) 54 MG CR tablet Take 1 tablet (54 mg) by mouth daily 30 tablet 0     [START ON 1/6/2020] methylphenidate (CONCERTA) 54 MG CR tablet Take 1 tablet (54 mg) by mouth daily 30 tablet 0     [START ON 2/6/2020] methylphenidate (CONCERTA) 54 MG CR tablet Take 1 tablet (54 mg) by mouth daily 30 tablet 0     methylphenidate (CONCERTA) 54 MG CR tablet Take 1 tablet (54 mg) by mouth daily 30 tablet 0     acyclovir (ZOVIRAX) 400 MG tablet Take 1 tablet (400 mg) by mouth 3 times daily (Patient not taking: Reported on 12/6/2019) 15 tablet 1     methylphenidate (CONCERTA) 54 MG CR tablet TAKE ONE TABLET BY MOUTH ONCE DAILY FILL 8-5-19 30 tablet 0     No Known Allergies  Recent Labs   Lab Test 11/05/18  0727 05/04/18  0849 05/23/17  0933   A1C 5.1  --  5.0   * 135* 134*   HDL 34* 36* 45   TRIG 124 92 77   ALT  --  50 65   CR  --  1.00 0.92   GFRESTIMATED  --  87 >90  Non   GFR Calc     GFRESTBLACK  --  >90 >90  African American GFR Calc     POTASSIUM  --  4.0 4.0   TSH  --   --  1.25      BP Readings from Last 3 Encounters:   12/06/19 138/84   06/04/19 137/81   11/05/18 133/83    Wt Readings from Last 3 Encounters:   12/06/19 117.5 kg (259 lb 1.6 oz)   06/04/19 115.8 kg (255 lb 3.2 oz)   11/05/18 113.6 kg (250 lb 6.4 oz)                      Reviewed and updated as needed this visit by Provider         Review of Systems   ROS COMP: CONSTITUTIONAL: NEGATIVE for fever, chills, change in weight  RESP: NEGATIVE for significant cough or SOB  CV: NEGATIVE for chest pain, palpitations or peripheral edema  PSYCHIATRIC: as above      Objective    /84 (BP Location: Right arm, Patient Position: Sitting, Cuff Size: Adult Large)   Pulse 54   Wt 117.5 kg (259 lb 1.6 oz)   SpO2 99%   BMI 37.44 kg/m    Body mass index is 37.44 kg/m .  Physical Exam   GENERAL: healthy, alert and no distress  NECK: no adenopathy, no asymmetry, masses, or scars and thyroid normal to palpation  RESP: lungs clear to auscultation - no rales, rhonchi or wheezes  CV: regular rate and rhythm, normal S1 S2, no S3 or S4, no murmur, click or rub, no peripheral edema and peripheral pulses strong  PSYCH: mentation appears normal, affect normal/bright    Diagnostic Test Results:  Labs reviewed in Epic        Assessment & Plan     1. Attention deficit disorder (ADD) without hyperactivity  Stable, continue with current dose of Concerta, follow for recheck in 6 months at the time of physical or sooner if needed  - methylphenidate (CONCERTA) 54 MG CR tablet; Take 1 tablet (54 mg) by mouth daily  Dispense: 30 tablet; Refill: 0  - methylphenidate (CONCERTA) 54 MG CR tablet; Take 1 tablet (54 mg) by mouth daily  Dispense: 30 tablet; Refill: 0  - methylphenidate (CONCERTA) 54 MG CR tablet; Take 1 tablet (54 mg) by mouth daily  Dispense: 30 tablet; Refill: 0       Chart documentation done in part with Dragon Voice  recognition Software. Although reviewed after completion, some word and grammatical error may remain.    See Patient Instructions    Return in about 6 months (around 6/6/2020).    López Tristan MD  [facility]

## 2020-02-23 ENCOUNTER — HEALTH MAINTENANCE LETTER (OUTPATIENT)
Age: 35
End: 2020-02-23

## 2020-02-25 ENCOUNTER — TELEPHONE (OUTPATIENT)
Dept: PEDIATRICS | Facility: CLINIC | Age: 35
End: 2020-02-25

## 2020-02-25 NOTE — TELEPHONE ENCOUNTER
Please confirm if you are ok with the pharmacy continuing to dispense the BX rated generic product for concerta. This is the same  that patient has been receiving.  Thank you  Mojgan Vargas,[facility]   [address]  [place] [address]  [phone]  Fax [phone]

## 2020-02-25 NOTE — TELEPHONE ENCOUNTER
Please ignore this message  [facility]   [address]  [place], [place] [address] [phone]  Fax [phone]

## 2020-05-11 ENCOUNTER — TELEPHONE (OUTPATIENT)
Dept: PEDIATRICS | Facility: CLINIC | Age: 35
End: 2020-05-11

## 2020-05-11 NOTE — TELEPHONE ENCOUNTER
Left message for patient to return clinic call regarding scheduling. Patient needs a video or phone  appointment for 6 month recheck with López Tristan MD around 6/6/2020. Number to clinic and Mychart option given, please assist in scheduling once patient returns clinic call.     Call Center OKAY TO SCHEDULE.    Thanks,   Amanda Cottrell  Primary Care   [facility] [place]

## 2020-06-08 ENCOUNTER — VIRTUAL VISIT (OUTPATIENT)
Dept: PEDIATRICS | Facility: CLINIC | Age: 35
End: 2020-06-08
Payer: COMMERCIAL

## 2020-06-08 DIAGNOSIS — R03.0 ELEVATED BLOOD PRESSURE READING WITHOUT DIAGNOSIS OF HYPERTENSION: Primary | ICD-10-CM

## 2020-06-08 DIAGNOSIS — F98.8 ATTENTION DEFICIT DISORDER (ADD) WITHOUT HYPERACTIVITY: ICD-10-CM

## 2020-06-08 PROCEDURE — 99213 OFFICE O/P EST LOW 20 MIN: CPT | Mod: 95 | Performed by: FAMILY MEDICINE

## 2020-06-08 RX ORDER — METHYLPHENIDATE HYDROCHLORIDE 54 MG/1
54 TABLET ORAL DAILY
Qty: 30 TABLET | Refills: 0 | Status: SHIPPED | OUTPATIENT
Start: 2020-06-08 | End: 2021-08-13

## 2020-06-08 NOTE — PROGRESS NOTES
"Tu Chaney is a 35 year old male who is being evaluated via a billable video visit.      The patient has been notified of following:     \"This video visit will be conducted via a call between you and your physician/provider. We have found that certain health care needs can be provided without the need for an in-person physical exam.  This service lets us provide the care you need with a video conversation.  If a prescription is necessary we can send it directly to your pharmacy.  If lab work is needed we can place an order for that and you can then stop by our lab to have the test done at a later time.    Video visits are billed at different rates depending on your insurance coverage.  Please reach out to your insurance provider with any questions.    If during the course of the call the physician/provider feels a video visit is not appropriate, you will not be charged for this service.\"    Patient has given verbal consent for Video visit? Yes    How would you like to obtain your AVS? Deliaharjeimy    Patient would like the video invitation sent by: Send to e-mail at: [email or web address]    Will anyone else be joining your video visit? No    Subjective   Patient is here for a video visit instead of in person visit due to the current COVID-19 pandemic.    Tu Chaney is a 35 year old male who presents today via video visit for the following health issues:    HPI  ADHD Following up    How many servings of fruits and vegetables do you eat daily?  0-1    On average, how many sweetened beverages do you drink each day (Examples: soda, juice, sweet tea, etc.  Do NOT count diet or artificially sweetened beverages)?   1    How many days per week do you exercise enough to make your heart beat faster? 4    How many minutes a day do you exercise enough to make your heart beat faster? 10 - 19    How many days per week do you miss taking your medication? 0        Video Start Time: 3;35pm        Patient Active Problem " List   Diagnosis     CARDIOVASCULAR SCREENING; LDL GOAL LESS THAN 160     Recurrent cold sores     Obesity (BMI 30-39.9)     Hypercholesteremia     Elevated fasting glucose     Vasovagal near-syncope     Elevated blood pressure reading without diagnosis of hypertension     Attention deficit disorder (ADD) without hyperactivity     Past Surgical History:   Procedure Laterality Date     TONSILLECTOMY         Social History     Tobacco Use     Smoking status: Never Smoker     Smokeless tobacco: Never Used     Tobacco comment: no passive exposure   Substance Use Topics     Alcohol use: Yes     Comment: occ     No family history on file.      Current Outpatient Medications   Medication Sig Dispense Refill     acyclovir (ZOVIRAX) 400 MG tablet Take 1 tablet (400 mg) by mouth 3 times daily 15 tablet 1     methylphenidate (CONCERTA) 54 MG CR tablet Take 1 tablet (54 mg) by mouth daily 30 tablet 0     methylphenidate (CONCERTA) 54 MG CR tablet TAKE ONE TABLET BY MOUTH ONCE DAILY FILL 8-5-19 30 tablet 0     methylphenidate (CONCERTA) 54 MG CR tablet Take 1 tablet (54 mg) by mouth daily 30 tablet 0     methylphenidate (CONCERTA) 54 MG CR tablet Take 1 tablet (54 mg) by mouth daily 30 tablet 0     methylphenidate (CONCERTA) 54 MG CR tablet Take 1 tablet (54 mg) by mouth daily 30 tablet 0     methylphenidate (CONCERTA) 54 MG CR tablet Take 1 tablet (54 mg) by mouth daily 30 tablet 0     methylphenidate (CONCERTA) 54 MG CR tablet Take 1 tablet (54 mg) by mouth daily 30 tablet 0     methylphenidate (CONCERTA) 54 MG CR tablet Take 1 tablet (54 mg) by mouth daily 30 tablet 0     No Known Allergies  Recent Labs   Lab Test 11/05/18  0727 05/04/18  0849 05/23/17  0933   A1C 5.1  --  5.0   * 135* 134*   HDL 34* 36* 45   TRIG 124 92 77   ALT  --  50 65   CR  --  1.00 0.92   GFRESTIMATED  --  87 >90  Non  GFR Calc     GFRESTBLACK  --  >90 >90  African American GFR Calc     POTASSIUM  --  4.0 4.0   TSH  --   --  1.25  "     BP Readings from Last 3 Encounters:   12/06/19 138/84   06/04/19 137/81   11/05/18 133/83    Wt Readings from Last 3 Encounters:   12/06/19 117.5 kg (259 lb 1.6 oz)   06/04/19 115.8 kg (255 lb 3.2 oz)   11/05/18 113.6 kg (250 lb 6.4 oz)                    Reviewed and updated as needed this visit by Provider         Review of Systems   CONSTITUTIONAL: NEGATIVE for fever, chills, change in weight  RESP: NEGATIVE for significant cough or SOB  CV: NEGATIVE for chest pain, palpitations or peripheral edema  PSYCHIATRIC: NEGATIVE for changes in mood or affect and h/o ADD      Objective    There were no vitals taken for this visit.  Estimated body mass index is 37.44 kg/m  as calculated from the following:    Height as of 6/4/19: 1.772 m (5' 9.75\").    Weight as of 12/6/19: 117.5 kg (259 lb 1.6 oz).  Physical Exam     GENERAL: Healthy, alert and no distress  RESP: No audible wheeze, cough, or visible cyanosis.  No visible retractions or increased work of breathing.    SKIN: Visible skin clear. No significant rash, abnormal pigmentation or lesions.  NEURO: Cranial nerves grossly intact.  Mentation and speech appropriate for age.  PSYCH: Mentation appears normal, affect normal/bright, judgement and insight intact, normal speech and appearance well-groomed.      Diagnostic Test Results:  Labs reviewed in Epic        Assessment & Plan     1. Attention deficit disorder (ADD) without hyperactivity  Stable, continue with current dose of concerta 54mg, recheck in 6 months at [facility]  Per patient's request,30 days suply was sent to the local pharmacy.  He understands to call for the 3 months supply with the next request to be sent to our clinic pharmacy.  - methylphenidate (CONCERTA) 54 MG CR tablet; Take 1 tablet (54 mg) by mouth daily  Dispense: 30 tablet; Refill: 0    2. Elevated blood pressure reading without diagnosis of hypertension  Reviewed home BP readings- normal range.  Will follow low salt diet, weight loss and regular " exercises.           Work on weight loss  Regular exercise  Chart documentation done in part with Dragon Voice recognition Software. Although reviewed after completion, some word and grammatical error may remain.    See Patient Instructions    No follow-ups on file.    López Tristan MD  [facility]      Video-Visit Details    Type of service:  Video Visit    Video End Time:3:43pm    Originating Location (pt. Location): Home    Distant Location (provider location):  [facility]     Platform used for Video Visit: [employer]    No follow-ups on file.       López Tristan MD

## 2020-12-06 ENCOUNTER — HEALTH MAINTENANCE LETTER (OUTPATIENT)
Age: 35
End: 2020-12-06

## 2020-12-11 ENCOUNTER — DOCUMENTATION ONLY (OUTPATIENT)
Dept: LAB | Facility: CLINIC | Age: 35
End: 2020-12-11

## 2020-12-11 DIAGNOSIS — Z13.1 SCREENING FOR DIABETES MELLITUS (DM): Primary | ICD-10-CM

## 2020-12-11 DIAGNOSIS — Z13.6 CARDIOVASCULAR SCREENING; LDL GOAL LESS THAN 160: ICD-10-CM

## 2020-12-16 ENCOUNTER — OFFICE VISIT (OUTPATIENT)
Dept: PEDIATRICS | Facility: CLINIC | Age: 35
End: 2020-12-16
Payer: COMMERCIAL

## 2020-12-16 VITALS
OXYGEN SATURATION: 100 % | BODY MASS INDEX: 35.73 KG/M2 | WEIGHT: 249.6 LBS | SYSTOLIC BLOOD PRESSURE: 134 MMHG | DIASTOLIC BLOOD PRESSURE: 80 MMHG | TEMPERATURE: 98.7 F | HEIGHT: 70 IN | HEART RATE: 54 BPM

## 2020-12-16 DIAGNOSIS — Z13.6 CARDIOVASCULAR SCREENING; LDL GOAL LESS THAN 160: ICD-10-CM

## 2020-12-16 DIAGNOSIS — Z13.1 SCREENING FOR DIABETES MELLITUS (DM): ICD-10-CM

## 2020-12-16 DIAGNOSIS — E78.00 HYPERCHOLESTEREMIA: ICD-10-CM

## 2020-12-16 DIAGNOSIS — Z00.00 ROUTINE GENERAL MEDICAL EXAMINATION AT A HEALTH CARE FACILITY: Primary | ICD-10-CM

## 2020-12-16 DIAGNOSIS — E66.9 OBESITY (BMI 30-39.9): ICD-10-CM

## 2020-12-16 DIAGNOSIS — R03.0 ELEVATED BLOOD PRESSURE READING WITHOUT DIAGNOSIS OF HYPERTENSION: ICD-10-CM

## 2020-12-16 DIAGNOSIS — F98.8 ATTENTION DEFICIT DISORDER (ADD) WITHOUT HYPERACTIVITY: ICD-10-CM

## 2020-12-16 LAB
CHOLEST SERPL-MCNC: 209 MG/DL
GLUCOSE SERPL-MCNC: 96 MG/DL (ref 70–99)
HDLC SERPL-MCNC: 37 MG/DL
LDLC SERPL CALC-MCNC: 147 MG/DL
NONHDLC SERPL-MCNC: 172 MG/DL
TRIGL SERPL-MCNC: 127 MG/DL

## 2020-12-16 PROCEDURE — 99395 PREV VISIT EST AGE 18-39: CPT | Performed by: FAMILY MEDICINE

## 2020-12-16 PROCEDURE — 36415 COLL VENOUS BLD VENIPUNCTURE: CPT | Performed by: FAMILY MEDICINE

## 2020-12-16 PROCEDURE — 80061 LIPID PANEL: CPT | Performed by: FAMILY MEDICINE

## 2020-12-16 PROCEDURE — 82947 ASSAY GLUCOSE BLOOD QUANT: CPT | Performed by: FAMILY MEDICINE

## 2020-12-16 RX ORDER — METHYLPHENIDATE HYDROCHLORIDE 54 MG/1
54 TABLET ORAL DAILY
Qty: 30 TABLET | Refills: 0 | Status: SHIPPED | OUTPATIENT
Start: 2021-02-16 | End: 2021-08-13

## 2020-12-16 RX ORDER — METHYLPHENIDATE HYDROCHLORIDE 54 MG/1
54 TABLET ORAL DAILY
Qty: 30 TABLET | Refills: 0 | Status: SHIPPED | OUTPATIENT
Start: 2021-01-16 | End: 2021-08-13

## 2020-12-16 RX ORDER — METHYLPHENIDATE HYDROCHLORIDE 54 MG/1
54 TABLET ORAL DAILY
Qty: 30 TABLET | Refills: 0 | Status: SHIPPED | OUTPATIENT
Start: 2020-12-16 | End: 2021-08-13

## 2020-12-16 ASSESSMENT — MIFFLIN-ST. JEOR: SCORE: 2079.46

## 2020-12-16 NOTE — PROGRESS NOTES
SUBJECTIVE:   CC: Tu Chaney is an 35 year old male who presents for preventative health visit.       Patient has been advised of split billing requirements and indicates understanding: Yes  Healthy Habits:    Getting at least 3 servings of Calcium per day:  NO    Bi-annual eye exam:  NO (Incomplete)    Dental care twice a year:  NO (Incomplete)    Sleep apnea or symptoms of sleep apnea:  None (Incomplete)    Diet:  Regular (no restrictions)    Frequency of exercise:  6-7 days/week    Duration of exercise:  Less than 15 minutes    Taking medications regularly:  Yes (Incomplete)    Medication side effects:  None (Incomplete)    PHQ-2 Total Score:PHQ2 Score: Incomplete.              Today's PHQ-2 Score:   PHQ-2 ( 1999 Pfizer) 12/14/2020   Q1: Little interest or pleasure in doing things -   Q2: Feeling down, depressed or hopeless -   PHQ-2 Score -   PHQ-2 Score Incomplete       Abuse: Current or Past(Physical, Sexual or Emotional)- No  Do you feel safe in your environment? Yes        Social History     Tobacco Use     Smoking status: Never Smoker     Smokeless tobacco: Never Used     Tobacco comment: no passive exposure   Substance Use Topics     Alcohol use: Yes     Comment: occ     If you drink alcohol do you typically have >3 drinks per day or >7 drinks per week?no    Alcohol Use 12/16/2020   Prescreen: >3 drinks/day or >7 drinks/week? No   Prescreen: >3 drinks/day or >7 drinks/week? -   No flowsheet data found.    Last PSA: No results found for: PSA    Reviewed orders with patient. Reviewed health maintenance and updated orders accordingly - Yes  Lab work is in process  Labs reviewed in EPIC  BP Readings from Last 3 Encounters:   12/16/20 (!) 142/80   12/06/19 138/84   06/04/19 137/81    Wt Readings from Last 3 Encounters:   12/16/20 113.2 kg (249 lb 9.6 oz)   12/06/19 117.5 kg (259 lb 1.6 oz)   06/04/19 115.8 kg (255 lb 3.2 oz)                  Patient Active Problem List   Diagnosis     CARDIOVASCULAR  SCREENING; LDL GOAL LESS THAN 160     Recurrent cold sores     Obesity (BMI 30-39.9)     Hypercholesteremia     Elevated fasting glucose     Vasovagal near-syncope     Elevated blood pressure reading without diagnosis of hypertension     Attention deficit disorder (ADD) without hyperactivity     Past Surgical History:   Procedure Laterality Date     TONSILLECTOMY         Social History     Tobacco Use     Smoking status: Never Smoker     Smokeless tobacco: Never Used     Tobacco comment: no passive exposure   Substance Use Topics     Alcohol use: Yes     Comment: occ     History reviewed. No pertinent family history.      Current Outpatient Medications   Medication Sig Dispense Refill     acyclovir (ZOVIRAX) 400 MG tablet Take 1 tablet (400 mg) by mouth 3 times daily 15 tablet 1     methylphenidate (CONCERTA) 54 MG CR tablet Take 1 tablet (54 mg) by mouth daily 30 tablet 0     [START ON 1/16/2021] methylphenidate (CONCERTA) 54 MG CR tablet Take 1 tablet (54 mg) by mouth daily 30 tablet 0     [START ON 2/16/2021] methylphenidate (CONCERTA) 54 MG CR tablet Take 1 tablet (54 mg) by mouth daily 30 tablet 0     methylphenidate (CONCERTA) 54 MG CR tablet Take 1 tablet (54 mg) by mouth daily 30 tablet 0     methylphenidate (CONCERTA) 54 MG CR tablet Take 1 tablet (54 mg) by mouth daily 30 tablet 0     methylphenidate (CONCERTA) 54 MG CR tablet TAKE ONE TABLET BY MOUTH ONCE DAILY FILL 8-5-19 30 tablet 0     methylphenidate (CONCERTA) 54 MG CR tablet Take 1 tablet (54 mg) by mouth daily 30 tablet 0     methylphenidate (CONCERTA) 54 MG CR tablet Take 1 tablet (54 mg) by mouth daily 30 tablet 0     methylphenidate (CONCERTA) 54 MG CR tablet Take 1 tablet (54 mg) by mouth daily 30 tablet 0     methylphenidate (CONCERTA) 54 MG CR tablet Take 1 tablet (54 mg) by mouth daily 30 tablet 0     methylphenidate (CONCERTA) 54 MG CR tablet Take 1 tablet (54 mg) by mouth daily 30 tablet 0     methylphenidate (CONCERTA) 54 MG CR tablet  Take 1 tablet (54 mg) by mouth daily 30 tablet 0     methylphenidate (CONCERTA) 54 MG CR tablet Take 1 tablet (54 mg) by mouth daily 30 tablet 0     methylphenidate (CONCERTA) 54 MG CR tablet Take 1 tablet (54 mg) by mouth daily 30 tablet 0     No Known Allergies  Recent Labs   Lab Test 12/16/20  0742 11/05/18  0727 05/04/18  0849 05/23/17  0933   A1C  --  5.1  --  5.0   * 142* 135* 134*   HDL 37* 34* 36* 45   TRIG 127 124 92 77   ALT  --   --  50 65   CR  --   --  1.00 0.92   GFRESTIMATED  --   --  87 >90  Non  GFR Calc     GFRESTBLACK  --   --  >90 >90  African American GFR Calc     POTASSIUM  --   --  4.0 4.0   TSH  --   --   --  1.25        Reviewed and updated as needed this visit by clinical staff  Tobacco  Allergies  Meds   Med Hx  Surg Hx  Fam Hx  Soc Hx        Reviewed and updated as needed this visit by Provider                  Past Medical History:   Diagnosis Date     adhd       Past Surgical History:   Procedure Laterality Date     TONSILLECTOMY       OB History   No obstetric history on file.       Review of Systems  CONSTITUTIONAL: NEGATIVE for fever, chills, change in weight  INTEGUMENTARY/SKIN: NEGATIVE for worrisome rashes, moles or lesions  EYES: NEGATIVE for vision changes or irritation  ENT: NEGATIVE for ear, mouth and throat problems  RESP: NEGATIVE for significant cough or SOB  CV: NEGATIVE for chest pain, palpitations or peripheral edema  GI: NEGATIVE for nausea, abdominal pain, heartburn, or change in bowel habits   male: negative for dysuria, hematuria, decreased urinary stream, erectile dysfunction, urethral discharge  MUSCULOSKELETAL: NEGATIVE for significant arthralgias or myalgia  NEURO: NEGATIVE for weakness, dizziness or paresthesias  ENDOCRINE: NEGATIVE for temperature intolerance, skin/hair changes  HEME/ALLERGY/IMMUNE: NEGATIVE for bleeding problems  PSYCHIATRIC: NEGATIVE for changes in mood or affect  PSYCHIATRIC: History of ADD    OBJECTIVE:   BP  "(!) 142/80   Pulse 54   Temp 98.7  F (37.1  C) (Temporal)   Ht 1.772 m (5' 9.75\")   Wt 113.2 kg (249 lb 9.6 oz)   SpO2 100%   BMI 36.07 kg/m      Physical Exam  GENERAL: healthy, alert and no distress  EYES: Eyes grossly normal to inspection, PERRL and conjunctivae and sclerae normal  HENT: ear canals and TM's normal, nose and mouth without ulcers or lesions  NECK: no adenopathy, no asymmetry, masses, or scars and thyroid normal to palpation  RESP: lungs clear to auscultation - no rales, rhonchi or wheezes  CV: regular rate and rhythm, normal S1 S2, no S3 or S4, no murmur, click or rub, no peripheral edema and peripheral pulses strong  ABDOMEN: soft, nontender, no hepatosplenomegaly, no masses and bowel sounds normal  MS: no gross musculoskeletal defects noted, no edema  SKIN: no suspicious lesions or rashes  NEURO: Normal strength and tone, mentation intact and speech normal  PSYCH: mentation appears normal, affect normal/bright    Diagnostic Test Results:  Labs reviewed in Epic  Results for orders placed or performed in visit on 12/16/20 (from the past 24 hour(s))   **Glucose FUTURE anytime   Result Value Ref Range    Glucose 96 70 - 99 mg/dL   Lipid panel reflex to direct LDL Fasting   Result Value Ref Range    Cholesterol 209 (H) <200 mg/dL    Triglycerides 127 <150 mg/dL    HDL Cholesterol 37 (L) >39 mg/dL    LDL Cholesterol Calculated 147 (H) <100 mg/dL    Non HDL Cholesterol 172 (H) <130 mg/dL       ASSESSMENT/PLAN:   1. Routine general medical examination at a health care facility  : Discussed on regular exercises, healthy eating, self testicular exams  and routine dental checks.      2. Attention deficit disorder (ADD) without hyperactivity  Stable, continue with current medications, follow for recheck in 6 months or sooner if needed  - methylphenidate (CONCERTA) 54 MG CR tablet; Take 1 tablet (54 mg) by mouth daily  Dispense: 30 tablet; Refill: 0  - methylphenidate (CONCERTA) 54 MG CR tablet; Take 1 " tablet (54 mg) by mouth daily  Dispense: 30 tablet; Refill: 0  - methylphenidate (CONCERTA) 54 MG CR tablet; Take 1 tablet (54 mg) by mouth daily  Dispense: 30 tablet; Refill: 0    3. Elevated blood pressure reading without diagnosis of hypertension  BP Readings from Last 3 Encounters:   12/16/20 134/80   12/06/19 138/84   06/04/19 137/81     Will follow low salt diet, weight loss and regular exercises.      4. Obesity (BMI 30-39.9)  Wt Readings from Last 5 Encounters:   12/16/20 113.2 kg (249 lb 9.6 oz)   12/06/19 117.5 kg (259 lb 1.6 oz)   06/04/19 115.8 kg (255 lb 3.2 oz)   11/05/18 113.6 kg (250 lb 6.4 oz)   05/04/18 114.8 kg (253 lb 1.6 oz)     Appreciated patient's efforts on weight loss, continue with portion control, healthy eating, regular exercises    5. Hypercholesteremia  Cholesterol   Date Value Ref Range Status   12/16/2020 209 (H) <200 mg/dL Final     Comment:     Desirable:       <200 mg/dl   11/05/2018 201 (H) <200 mg/dL Final     Comment:     Desirable:       <200 mg/dl     HDL Cholesterol   Date Value Ref Range Status   12/16/2020 37 (L) >39 mg/dL Final   11/05/2018 34 (L) >39 mg/dL Final     LDL Cholesterol Calculated   Date Value Ref Range Status   12/16/2020 147 (H) <100 mg/dL Final     Comment:     Above desirable:  100-129 mg/dl  Borderline High:  130-159 mg/dL  High:             160-189 mg/dL  Very high:       >189 mg/dl     11/05/2018 142 (H) <100 mg/dL Final     Comment:     Above desirable:  100-129 mg/dl  Borderline High:  130-159 mg/dL  High:             160-189 mg/dL  Very high:       >189 mg/dl       Triglycerides   Date Value Ref Range Status   12/16/2020 127 <150 mg/dL Final   11/05/2018 124 <150 mg/dL Final     Comment:     Fasting specimen     Cholesterol/HDL Ratio   Date Value Ref Range Status   09/23/2014 4.8 0.0 - 5.0 Final   02/15/2012 6.1 (H) 0.0 - 5.0 Final     Reviewed elevated fasting lipid numbers  Reviewed heart healthy diet, start on regular exercises, recheck in 1 year  "or sooner if needed      Patient has been advised of split billing requirements and indicates understanding: Yes  COUNSELING:   Reviewed preventive health counseling, as reflected in patient instructions  Special attention given to:        Regular exercise       Healthy diet/nutrition       Vision screening       Immunizations    Declined: Influenza due to Other             Estimated body mass index is 36.07 kg/m  as calculated from the following:    Height as of this encounter: 1.772 m (5' 9.75\").    Weight as of this encounter: 113.2 kg (249 lb 9.6 oz).     Weight management plan: Discussed healthy diet and exercise guidelines    He reports that he has never smoked. He has never used smokeless tobacco.      Counseling Resources:  ATP IV Guidelines  Pooled Cohorts Equation Calculator  FRAX Risk Assessment  ICSI Preventive Guidelines  Dietary Guidelines for Americans, 2010  USDA's MyPlate  ASA Prophylaxis  Lung CA Screening    López Tristan MD  [facility]  Chart documentation done in part with Dragon Voice recognition Software. Although reviewed after completion, some word and grammatical error may remain.    "

## 2020-12-16 NOTE — PROGRESS NOTES
"  3  SUBJECTIVE:   CC: Tu Chaney is an 35 year old male who presents for preventive health visit.     {Split Bill scripting  The purpose of this visit is to discuss your medical history and prevent health problems before you are sick. You may be responsible for a co-pay, coinsurance, or deductible if your visit today includes services such as checking on a sore throat, having an x-ray or lab test, or treating and evaluating a new or existing condition :836862}  Patient has been advised of split billing requirements and indicates understanding: {Yes and No:828034}  Healthy Habits:    Do you get at least three servings of calcium containing foods daily (dairy, green leafy vegetables, etc.)? { :129872::\"yes\"}    Amount of exercise or daily activities, outside of work: { :303206}    Problems taking medications regularly { :469907::\"No\"}    Medication side effects: { :381457::\"No\"}    Have you had an eye exam in the past two years? { :869668}    Do you see a dentist twice per year? { :351364}    Do you have sleep apnea, excessive snoring or daytime drowsiness?{ :070867}  {Outside tests to abstract? :399163}    {additional problems to add (Optional):647467}    Today's PHQ-2 Score:   PHQ-2 ( 1999 Pfizer) 12/14/2020 11/5/2018   Q1: Little interest or pleasure in doing things - 0   Q2: Feeling down, depressed or hopeless - 0   PHQ-2 Score - 0   PHQ-2 Score Incomplete -     {PHQ-2 LOOK IN ASSESSMENTS (Optional) :548605}  Abuse: Current or Past(Physical, Sexual or Emotional)- {YES/NO/NA:973073}  Do you feel safe in your environment? {YES/NO/NA:248249}        Social History     Tobacco Use     Smoking status: Never Smoker     Smokeless tobacco: Never Used     Tobacco comment: no passive exposure   Substance Use Topics     Alcohol use: Yes     Comment: occ     If you drink alcohol do you typically have >3 drinks per day or >7 drinks per week? {ETOH :472298}                      Last PSA: No results found for: " "PSA    Reviewed orders with patient. Reviewed health maintenance and updated orders accordingly - {Yes/No:577116::\"Yes\"}  {Chronicprobdata (Optional):815765}    Reviewed and updated as needed this visit by clinical staff                 Reviewed and updated as needed this visit by Provider                {HISTORY OPTIONS (Optional):134069}    ROS:  { :620699::\"CONSTITUTIONAL: NEGATIVE for fever, chills, change in weight\",\"INTEGUMENTARY/SKIN: NEGATIVE for worrisome rashes, moles or lesions\",\"EYES: NEGATIVE for vision changes or irritation\",\"ENT: NEGATIVE for ear, mouth and throat problems\",\"RESP: NEGATIVE for significant cough or SOB\",\"CV: NEGATIVE for chest pain, palpitations or peripheral edema\",\"GI: NEGATIVE for nausea, abdominal pain, heartburn, or change in bowel habits\",\" male: negative for dysuria, hematuria, decreased urinary stream, erectile dysfunction, urethral discharge\",\"MUSCULOSKELETAL: NEGATIVE for significant arthralgias or myalgia\",\"NEURO: NEGATIVE for weakness, dizziness or paresthesias\",\"PSYCHIATRIC: NEGATIVE for changes in mood or affect\"}    OBJECTIVE:   There were no vitals taken for this visit.  EXAM:  {Exam Choices:211119}    {Diagnostic Test Results (Optional):505527::\"Diagnostic Test Results:\",\"Labs reviewed in Epic\"}    ASSESSMENT/PLAN:   {Diag Picklist:982782}    Patient has been advised of split billing requirements and indicates understanding: {YES / NO:136257::\"Yes\"}  COUNSELING:  {MALE COUNSELING MESSAGES:250318::\"Reviewed preventive health counseling, as reflected in patient instructions\"}    Estimated body mass index is 37.44 kg/m  as calculated from the following:    Height as of 6/4/19: 1.772 m (5' 9.75\").    Weight as of 12/6/19: 117.5 kg (259 lb 1.6 oz).    {Weight Management Plan (ACO) Complete if BMI is abnormal-  Ages 18-64  BMI >24.9.  Age 65+ with BMI <23 or >30 (Optional):476977}    He reports that he has never smoked. He has never used smokeless tobacco.      Counseling " Resources:  ATP IV Guidelines  Pooled Cohorts Equation Calculator  FRAX Risk Assessment  ICSI Preventive Guidelines  Dietary Guidelines for Americans, 2010  USDA's MyPlate  ASA Prophylaxis  Lung CA Screening    López Tristan MD  [facility]

## 2020-12-16 NOTE — PATIENT INSTRUCTIONS
Schedule for recheck in 6 months  BP recheck today      Preventive Health Recommendations  Male Ages 26 - 39    Yearly exam:             See your health care provider every year in order to  o   Review health changes.   o   Discuss preventive care.    o   Review your medicines if your doctor has prescribed any.    You should be tested each year for STDs (sexually transmitted diseases), if you re at risk.     After age 35, talk to your provider about cholesterol testing. If you are at risk for heart disease, have your cholesterol tested at least every 5 years.     If you are at risk for diabetes, you should have a diabetes test (fasting glucose).  Shots: Get a flu shot each year. Get a tetanus shot every 10 years.     Nutrition:    Eat at least 5 servings of fruits and vegetables daily.     Eat whole-grain bread, whole-wheat pasta and brown rice instead of white grains and rice.     Get adequate Calcium and Vitamin D.     Lifestyle    Exercise for at least 150 minutes a week (30 minutes a day, 5 days a week). This will help you control your weight and prevent disease.     Limit alcohol to one drink per day.     No smoking.     Wear sunscreen to prevent skin cancer.     See your dentist every six months for an exam and cleaning.

## 2021-06-23 ENCOUNTER — MYC REFILL (OUTPATIENT)
Dept: PEDIATRICS | Facility: CLINIC | Age: 36
End: 2021-06-23

## 2021-06-23 DIAGNOSIS — F98.8 ATTENTION DEFICIT DISORDER (ADD) WITHOUT HYPERACTIVITY: ICD-10-CM

## 2021-06-23 RX ORDER — METHYLPHENIDATE HYDROCHLORIDE 54 MG/1
54 TABLET ORAL DAILY
Qty: 30 TABLET | Refills: 0 | Status: SHIPPED | OUTPATIENT
Start: 2021-06-23 | End: 2021-07-23

## 2021-06-23 NOTE — TELEPHONE ENCOUNTER
Patient is overdue 6-month recheck on Concerta  Please schedule for virtual visit next week  Refill is sent as requested.

## 2021-06-23 NOTE — LETTER
June 24, 2021    Tu Chaney  [address] [address] [facility] [address]    Dear Tu,       We recently received a refill request for methylphenidate (CONCERTA).  We have refilled this for a one time 30 day supply only because you are due for a:    Virtual ADHD office visit for FURTHER REFILLS IN THE NEXT MONTH. You are overdue for your 6 month check.      Please call at your earliest convenience so that there will not be a delay with your future refills.          Thank you,   Your [facility] Team/[facility]  [phone]

## 2021-06-23 NOTE — TELEPHONE ENCOUNTER
Routing refill request to provider for review/approval because:  Drug not on the [facility] refill protocol     Sachi BEATTYN, RN

## 2021-08-13 ENCOUNTER — OFFICE VISIT (OUTPATIENT)
Dept: FAMILY MEDICINE | Facility: CLINIC | Age: 36
End: 2021-08-13
Payer: COMMERCIAL

## 2021-08-13 VITALS
TEMPERATURE: 98.5 F | WEIGHT: 257.1 LBS | OXYGEN SATURATION: 98 % | SYSTOLIC BLOOD PRESSURE: 150 MMHG | DIASTOLIC BLOOD PRESSURE: 80 MMHG | HEART RATE: 68 BPM | BODY MASS INDEX: 37.15 KG/M2

## 2021-08-13 DIAGNOSIS — E66.01 MORBID OBESITY (H): ICD-10-CM

## 2021-08-13 DIAGNOSIS — F98.8 ATTENTION DEFICIT DISORDER (ADD) WITHOUT HYPERACTIVITY: Primary | ICD-10-CM

## 2021-08-13 DIAGNOSIS — R03.0 ELEVATED BLOOD PRESSURE READING WITHOUT DIAGNOSIS OF HYPERTENSION: ICD-10-CM

## 2021-08-13 PROCEDURE — 99214 OFFICE O/P EST MOD 30 MIN: CPT | Performed by: FAMILY MEDICINE

## 2021-08-13 RX ORDER — METHYLPHENIDATE HYDROCHLORIDE 54 MG/1
54 TABLET ORAL DAILY
Qty: 30 TABLET | Refills: 0 | Status: SHIPPED | OUTPATIENT
Start: 2021-10-14 | End: 2021-11-13

## 2021-08-13 RX ORDER — METHYLPHENIDATE HYDROCHLORIDE 54 MG/1
54 TABLET ORAL DAILY
Qty: 30 TABLET | Refills: 0 | Status: SHIPPED | OUTPATIENT
Start: 2021-08-13 | End: 2021-09-12

## 2021-08-13 RX ORDER — METHYLPHENIDATE HYDROCHLORIDE 54 MG/1
54 TABLET ORAL DAILY
Qty: 30 TABLET | Refills: 0 | Status: SHIPPED | OUTPATIENT
Start: 2021-09-13 | End: 2021-10-13

## 2021-08-13 NOTE — PROGRESS NOTES
"    Assessment & Plan     Attention deficit disorder (ADD) without hyperactivity  Stable, continue with current medications, follow for recheck in 6 months or sooner if needed  - methylphenidate (CONCERTA) 54 MG CR tablet; Take 1 tablet (54 mg) by mouth daily  - methylphenidate (CONCERTA) 54 MG CR tablet; Take 1 tablet (54 mg) by mouth daily  - methylphenidate (CONCERTA) 54 MG CR tablet; Take 1 tablet (54 mg) by mouth daily    Morbid obesity (H)  Wt Readings from Last 5 Encounters:   08/13/21 116.6 kg (257 lb 1.6 oz)   12/16/20 113.2 kg (249 lb 9.6 oz)   12/06/19 117.5 kg (259 lb 1.6 oz)   06/04/19 115.8 kg (255 lb 3.2 oz)   11/05/18 113.6 kg (250 lb 6.4 oz)     Emphasized on weight loss, portion control, low calorie and low fat diet, healthy eating, regular exercises.      Elevated blood pressure reading without diagnosis of hypertension  BP Readings from Last 6 Encounters:   08/13/21 (!) 150/80   12/16/20 134/80   12/06/19 138/84   06/04/19 137/81   11/05/18 133/83   05/04/18 114/78     Initial blood pressure was 149/89  Rechecked-150/80  Recommended to start taking blood pressures at home twice a day  Follow-up for blood pressure recheck in 4 weeks along with a blood pressure log, patient will also bring his home blood pressure monitor for counter checking  Will follow low salt diet, weight loss and regular exercises.  Will consider starting on antihypertensives  for persistent or worsening concerns                 BMI:   Estimated body mass index is 37.15 kg/m  as calculated from the following:    Height as of 12/16/20: 1.772 m (5' 9.75\").    Weight as of this encounter: 116.6 kg (257 lb 1.6 oz).   Weight management plan: Discussed healthy diet and exercise guidelines    Work on weight loss  Regular exercise  Chart documentation done in part with Dragon Voice recognition Software. Although reviewed after completion, some word and grammatical error may remain.    See Patient Instructions    Return in about 4 " weeks (around 9/10/2021) for follow up.    López Tristan MD  [facility] VAN Mae is a 36 year old who presents for the following health issues     History of Present Illness       He eats 0-1 servings of fruits and vegetables daily.He consumes 1 sweetened beverage(s) daily.He exercises with enough effort to increase his heart rate 10 to 19 minutes per day.  He exercises with enough effort to increase his heart rate 4 days per week.   He is taking medications regularly.       Medication Followup of Concerta     Taking Medication as prescribed: yes    Side Effects:  None    Medication Helping Symptoms:  yes     Elevated blood pressure with no history of hypertension-patient has been having elevated systolic blood pressures for the past couple of years  Has no underlying history of hypertension, thyroid disorder  Denies snoring, daytime sleepiness, concerns for sleep apnea  Very rarely drinks alcohol, does not smoke  Has no recreational drug use  Denies family history of hypertension   Denies chest pain, SOB, edema legs, visual concerns, focal neurological symptoms, dizziness, syncope, palpitations.      Review of Systems   CONSTITUTIONAL: NEGATIVE for fever, chills, change in weight  RESP: NEGATIVE for significant cough or SOB  CV: NEGATIVE for chest pain, palpitations or peripheral edema  CV: as above  GI: NEGATIVE for nausea, abdominal pain, heartburn, or change in bowel habits  MUSCULOSKELETAL: NEGATIVE for significant arthralgias or myalgia  NEURO: NEGATIVE for weakness, dizziness or paresthesias  ENDOCRINE: NEGATIVE for temperature intolerance, skin/hair changes  HEME/ALLERGY/IMMUNE: NEGATIVE for bleeding problems  PSYCHIATRIC: as above      Objective    BP (!) 150/80   Pulse 68   Temp 98.5  F (36.9  C) (Tympanic)   Wt 116.6 kg (257 lb 1.6 oz)   SpO2 98%   BMI 37.15 kg/m    Body mass index is 37.15 kg/m .  Physical Exam   GENERAL: healthy, alert and no  distress  RESP: lungs clear to auscultation - no rales, rhonchi or wheezes  CV: regular rate and rhythm, normal S1 S2, no S3 or S4, no murmur, click or rub, no peripheral edema and peripheral pulses strong  MS: no gross musculoskeletal defects noted, no edema  NEURO: Normal strength and tone, mentation intact and speech normal  PSYCH: mentation appears normal, affect normal/bright

## 2021-09-14 ENCOUNTER — OFFICE VISIT (OUTPATIENT)
Dept: FAMILY MEDICINE | Facility: CLINIC | Age: 36
End: 2021-09-14
Payer: COMMERCIAL

## 2021-09-14 VITALS
SYSTOLIC BLOOD PRESSURE: 132 MMHG | DIASTOLIC BLOOD PRESSURE: 82 MMHG | OXYGEN SATURATION: 98 % | TEMPERATURE: 97.7 F | BODY MASS INDEX: 37.23 KG/M2 | RESPIRATION RATE: 14 BRPM | HEART RATE: 71 BPM | WEIGHT: 257.6 LBS

## 2021-09-14 DIAGNOSIS — R03.0 ELEVATED BLOOD PRESSURE READING WITHOUT DIAGNOSIS OF HYPERTENSION: Primary | ICD-10-CM

## 2021-09-14 PROCEDURE — 99213 OFFICE O/P EST LOW 20 MIN: CPT | Performed by: FAMILY MEDICINE

## 2021-09-14 ASSESSMENT — PAIN SCALES - GENERAL: PAINLEVEL: NO PAIN (0)

## 2021-09-14 NOTE — PROGRESS NOTES
Assessment & Plan     Elevated blood pressure reading without diagnosis of hypertension  BP Readings from Last 6 Encounters:   09/14/21 132/82   08/13/21 (!) 150/80   12/16/20 134/80   12/06/19 138/84   06/04/19 137/81   11/05/18 133/83     Reviewed home blood pressure readings, which showed a few slightly elevated systolic blood pressure numbers  Reviewed normal office blood pressure numbers from today  Patient brought the home blood pressure unit-check 1 reading at the end of the visit that showed 145/88  Will follow low salt diet, weight loss and regular exercises.  We will recheck at the next visit in 3 months at the time of annual physical  We will aim for 5 pound weight loss in the next 3 months  Reviewed signs and symptoms for hypertension, patient will contact us if he develops any of these through MyChart or through office visit  Patient verbalised understanding and is agreeable to the plan.                 Work on weight loss  Regular exercise  Chart documentation done in part with Dragon Voice recognition Software. Although reviewed after completion, some word and grammatical error may remain.    See Patient Instructions    Return in about 3 months (around 12/14/2021), or if symptoms worsen or fail to improve, for Physical Exam.    López Tristan MD  [facility] VAN Mae is a 36 year old who presents for the following health issues     Patient is here for follow-up on his elevated blood pressures from last visit  He has been checking blood pressure numbers at home daily, noted a few slightly elevated systolic blood pressure readings in the range of 140s to 150s   Denies chest pain, SOB, edema legs, visual concerns, focal neurological symptoms, dizziness, syncope, palpitations.  Does not have a history of snoring  On Concerta for ADD for quite a few years now    History of Present Illness       Hypertension: He presents for follow up of hypertension.  He does  check blood pressure  regularly outside of the clinic. Outside blood pressures have been over 140/90. He does not follow a low salt diet.     He eats 0-1 servings of fruits and vegetables daily.He consumes 2 sweetened beverage(s) daily.He exercises with enough effort to increase his heart rate 9 or less minutes per day.  He exercises with enough effort to increase his heart rate 3 or less days per week.   He is taking medications regularly.             Review of Systems   CONSTITUTIONAL: NEGATIVE for fever, chills, change in weight  RESP: NEGATIVE for significant cough or SOB  CV: NEGATIVE for chest pain, palpitations or peripheral edema  CV: Hx HTN  MUSCULOSKELETAL: NEGATIVE for significant arthralgias or myalgia  NEURO: NEGATIVE for weakness, dizziness or paresthesias  PSYCHIATRIC: NEGATIVE for changes in mood or affect and history of ADHD      Objective    /82   Pulse 71   Temp 97.7  F (36.5  C) (Oral)   Resp 14   Wt 116.8 kg (257 lb 9.6 oz)   SpO2 98%   BMI 37.23 kg/m    Body mass index is 37.23 kg/m .  Physical Exam   GENERAL: healthy, alert and no distress  RESP: lungs clear to auscultation - no rales, rhonchi or wheezes  CV: regular rate and rhythm, normal S1 S2, no S3 or S4, no murmur, click or rub, no peripheral edema and peripheral pulses strong  MS: no gross musculoskeletal defects noted, no edema  PSYCH: mentation appears normal, affect normal/bright

## 2021-09-25 ENCOUNTER — HEALTH MAINTENANCE LETTER (OUTPATIENT)
Age: 36
End: 2021-09-25

## 2022-02-21 ENCOUNTER — MYC REFILL (OUTPATIENT)
Dept: FAMILY MEDICINE | Facility: CLINIC | Age: 37
End: 2022-02-21
Payer: COMMERCIAL

## 2022-02-21 DIAGNOSIS — F98.8 ATTENTION DEFICIT DISORDER (ADD) WITHOUT HYPERACTIVITY: ICD-10-CM

## 2022-02-22 NOTE — TELEPHONE ENCOUNTER
Routing refill request to provider for review/approval because:  Drug not on the [facility] refill protocol   Mariah Conde BSN, RN

## 2022-02-23 RX ORDER — METHYLPHENIDATE HYDROCHLORIDE 54 MG/1
54 TABLET ORAL DAILY
Qty: 30 TABLET | Refills: 0 | Status: SHIPPED | OUTPATIENT
Start: 2022-02-23 | End: 2022-03-30

## 2022-02-23 NOTE — TELEPHONE ENCOUNTER
Refill is sent as requested.  Patient is due for medication recheck in 2 weeks-please help him schedule in 1 to 2 weeks

## 2022-03-12 ENCOUNTER — HEALTH MAINTENANCE LETTER (OUTPATIENT)
Age: 37
End: 2022-03-12

## 2022-03-30 ENCOUNTER — TELEPHONE (OUTPATIENT)
Dept: FAMILY MEDICINE | Facility: CLINIC | Age: 37
End: 2022-03-30

## 2022-03-30 ENCOUNTER — VIRTUAL VISIT (OUTPATIENT)
Dept: FAMILY MEDICINE | Facility: CLINIC | Age: 37
End: 2022-03-30
Payer: COMMERCIAL

## 2022-03-30 DIAGNOSIS — Z13.1 SCREENING FOR DIABETES MELLITUS (DM): ICD-10-CM

## 2022-03-30 DIAGNOSIS — F98.8 ATTENTION DEFICIT DISORDER (ADD) WITHOUT HYPERACTIVITY: Primary | ICD-10-CM

## 2022-03-30 DIAGNOSIS — E78.1 HYPERTRIGLYCERIDEMIA: ICD-10-CM

## 2022-03-30 DIAGNOSIS — R03.0 ELEVATED BLOOD PRESSURE READING WITHOUT DIAGNOSIS OF HYPERTENSION: ICD-10-CM

## 2022-03-30 DIAGNOSIS — Z13.220 SCREENING FOR HYPERLIPIDEMIA: ICD-10-CM

## 2022-03-30 DIAGNOSIS — Z13.0 SCREENING FOR DEFICIENCY ANEMIA: ICD-10-CM

## 2022-03-30 DIAGNOSIS — Z13.29 SCREENING FOR THYROID DISORDER: ICD-10-CM

## 2022-03-30 DIAGNOSIS — E66.01 MORBID OBESITY (H): ICD-10-CM

## 2022-03-30 PROCEDURE — 99213 OFFICE O/P EST LOW 20 MIN: CPT | Mod: 95 | Performed by: FAMILY MEDICINE

## 2022-03-30 RX ORDER — METHYLPHENIDATE HYDROCHLORIDE 54 MG/1
54 TABLET ORAL DAILY
Qty: 30 TABLET | Refills: 0 | Status: SHIPPED | OUTPATIENT
Start: 2022-05-31 | End: 2022-06-30

## 2022-03-30 RX ORDER — METHYLPHENIDATE HYDROCHLORIDE 54 MG/1
54 TABLET ORAL DAILY
Qty: 30 TABLET | Refills: 0 | Status: SHIPPED | OUTPATIENT
Start: 2022-03-30 | End: 2022-04-29

## 2022-03-30 RX ORDER — METHYLPHENIDATE HYDROCHLORIDE 54 MG/1
54 TABLET ORAL DAILY
Qty: 30 TABLET | Refills: 0 | Status: SHIPPED | OUTPATIENT
Start: 2022-04-30 | End: 2022-05-30

## 2022-03-30 NOTE — PROGRESS NOTES
Tu is a 36 year old who is being evaluated via a billable video visit.      How would you like to obtain your AVS? MyChart  If the video visit is dropped, the invitation should be resent by: Send to e-mail at: [email or web address]  Will anyone else be joining your video visit? No    Video Start Time: 1 PM    Assessment & Plan     Attention deficit disorder (ADD) without hyperactivity  Stable, continue current dose of Concerta 54 mg daily, follow for recheck in July at the time of annual physical or sooner if needed  - methylphenidate (CONCERTA) 54 MG CR tablet; Take 1 tablet (54 mg) by mouth daily  - methylphenidate (CONCERTA) 54 MG CR tablet; Take 1 tablet (54 mg) by mouth daily  - methylphenidate (CONCERTA) 54 MG CR tablet; Take 1 tablet (54 mg) by mouth daily    Morbid obesity (H)  Wt Readings from Last 5 Encounters:   09/14/21 116.8 kg (257 lb 9.6 oz)   08/13/21 116.6 kg (257 lb 1.6 oz)   12/16/20 113.2 kg (249 lb 9.6 oz)   12/06/19 117.5 kg (259 lb 1.6 oz)   06/04/19 115.8 kg (255 lb 3.2 oz)     Patient reported a weight loss of about 8 to 9 pounds in the past 6 months  Continue with portion control, healthy eating, regular exercises, recheck at the time of annual physical    Elevated blood pressure reading without diagnosis of hypertension  Patient's home blood pressure readings are in normal range, continue with efforts on weight loss, low-salt diet regular exercises          Chart documentation done in part with Dragon Voice recognition Software. Although reviewed after completion, some word and grammatical error may remain.    See Patient Instructions    Return in about 5 months (around 8/30/2022), or if symptoms worsen or fail to improve, for fasting labs, Physical Exam.    López Tristan MD  [facility]    Dominick Mae is a 36 year old who presents for the following health issues   Patient is here for a video visit instead of in person visit due to the current  COVID-19 pandemic.      History of Present Illness       Reason for visit:  Medication recheck    He eats 0-1 servings of fruits and vegetables daily.He consumes 1 sweetened beverage(s) daily.He exercises with enough effort to increase his heart rate 10 to 19 minutes per day.  He exercises with enough effort to increase his heart rate 3 or less days per week.   He is taking medications regularly.       Medication Followup of Concerta for ADhD    Taking Medication as prescribed: yes    Side Effects:  None    Medication Helping Symptoms:  yes         Review of Systems   CONSTITUTIONAL: NEGATIVE for fever, chills, change in weight  RESP: NEGATIVE for significant cough or SOB  CV: NEGATIVE for chest pain, palpitations or peripheral edema  NEURO: NEGATIVE for weakness, dizziness or paresthesias  PSYCHIATRIC: NEGATIVE for changes in mood or affect and H/O ADHD      Objective           Vitals:  No vitals were obtained today due to virtual visit.    Physical Exam   GENERAL: Healthy, alert and no distress  EYES: Eyes grossly normal to inspection  RESP: No audible wheeze, cough, or visible cyanosis.  No visible retractions or increased work of breathing.    SKIN: Visible skin-clear  NEURO: Cranial nerves grossly intact.  Mentation and speech appropriate for age.  PSYCH: Mentation appears normal, affect normal/bright, judgement and insight intact, normal speech and appearance well-groomed.                Video-Visit Details    Type of service:  Video Visit    Video End Time:1:05 PM    Originating Location (pt. Location): Home    Distant Location (provider location):  [facility]     Platform used for Video Visit: [employer]

## 2022-11-11 ENCOUNTER — OFFICE VISIT (OUTPATIENT)
Dept: FAMILY MEDICINE | Facility: CLINIC | Age: 37
End: 2022-11-11
Payer: COMMERCIAL

## 2022-11-11 VITALS
RESPIRATION RATE: 18 BRPM | HEIGHT: 70 IN | HEART RATE: 75 BPM | TEMPERATURE: 98.1 F | WEIGHT: 266.5 LBS | DIASTOLIC BLOOD PRESSURE: 88 MMHG | BODY MASS INDEX: 38.15 KG/M2 | SYSTOLIC BLOOD PRESSURE: 134 MMHG | OXYGEN SATURATION: 98 %

## 2022-11-11 DIAGNOSIS — E66.01 MORBID OBESITY (H): ICD-10-CM

## 2022-11-11 DIAGNOSIS — F98.8 ATTENTION DEFICIT DISORDER (ADD) WITHOUT HYPERACTIVITY: ICD-10-CM

## 2022-11-11 DIAGNOSIS — Z00.00 ROUTINE GENERAL MEDICAL EXAMINATION AT A HEALTH CARE FACILITY: Primary | ICD-10-CM

## 2022-11-11 DIAGNOSIS — Z23 HIGH PRIORITY FOR COVID-19 VACCINATION: ICD-10-CM

## 2022-11-11 PROCEDURE — 99395 PREV VISIT EST AGE 18-39: CPT | Mod: 25 | Performed by: FAMILY MEDICINE

## 2022-11-11 PROCEDURE — 99213 OFFICE O/P EST LOW 20 MIN: CPT | Mod: 25 | Performed by: FAMILY MEDICINE

## 2022-11-11 PROCEDURE — 91312 COVID-19,PF,PFIZER BOOSTER BIVALENT: CPT | Performed by: FAMILY MEDICINE

## 2022-11-11 PROCEDURE — 0124A COVID-19,PF,PFIZER BOOSTER BIVALENT: CPT | Performed by: FAMILY MEDICINE

## 2022-11-11 RX ORDER — METHYLPHENIDATE HYDROCHLORIDE 54 MG/1
54 TABLET ORAL DAILY
Qty: 30 TABLET | Refills: 0 | Status: CANCELLED | OUTPATIENT
Start: 2022-11-11

## 2022-11-11 RX ORDER — METHYLPHENIDATE HYDROCHLORIDE 54 MG/1
54 TABLET ORAL DAILY
Qty: 30 TABLET | Refills: 0 | Status: SHIPPED | OUTPATIENT
Start: 2022-12-12 | End: 2023-01-11

## 2022-11-11 RX ORDER — METHYLPHENIDATE HYDROCHLORIDE 54 MG/1
54 TABLET ORAL DAILY
Qty: 30 TABLET | Refills: 0 | Status: SHIPPED | OUTPATIENT
Start: 2022-11-11 | End: 2022-11-16

## 2022-11-11 RX ORDER — METHYLPHENIDATE HYDROCHLORIDE 54 MG/1
54 TABLET ORAL DAILY
Qty: 30 TABLET | Refills: 0 | Status: SHIPPED | OUTPATIENT
Start: 2023-01-12 | End: 2023-02-11

## 2022-11-11 ASSESSMENT — ENCOUNTER SYMPTOMS
NAUSEA: 0
PARESTHESIAS: 0
DIZZINESS: 0
MYALGIAS: 0
JOINT SWELLING: 0
FREQUENCY: 0
HEMATURIA: 0
CHILLS: 0
NERVOUS/ANXIOUS: 0
EYE PAIN: 0
DIARRHEA: 0
SHORTNESS OF BREATH: 0
SORE THROAT: 0
FEVER: 0
COUGH: 0
DYSURIA: 0
ARTHRALGIAS: 0
HEMATOCHEZIA: 0
ABDOMINAL PAIN: 0
PALPITATIONS: 0
HEARTBURN: 0
CONSTIPATION: 0
WEAKNESS: 0
HEADACHES: 0

## 2022-11-11 ASSESSMENT — PAIN SCALES - GENERAL: PAINLEVEL: NO PAIN (0)

## 2022-11-11 NOTE — PROGRESS NOTES
SUBJECTIVE:   CC: Tu is an 37 year old who presents for preventative health visit.       Patient has been advised of split billing requirements and indicates understanding: Yes  Healthy Habits:     Getting at least 3 servings of Calcium per day:  NO    Bi-annual eye exam:  NO    Dental care twice a year:  NO    Sleep apnea or symptoms of sleep apnea:  None    Diet:  Regular (no restrictions)    Frequency of exercise:  None    Taking medications regularly:  Yes    Medication side effects:  None    PHQ-2 Total Score: 0    Additional concerns today:  No        Medication Followup of Concerta    Taking Medication as prescribed: yes    Side Effects:  None    Medication Helping Symptoms:  yes      Today's PHQ-2 Score:   PHQ-2 ( 1999 Pf[employer]) 11/11/2022   Q1: Little interest or pleasure in doing things 0   Q2: Feeling down, depressed or hopeless 0   PHQ-2 Score 0   PHQ-2 Total Score (12-17 Years)- Positive if 3 or more points; Administer PHQ-A if positive -   Q1: Little interest or pleasure in doing things Not at all   Q2: Feeling down, depressed or hopeless Not at all   PHQ-2 Score 0       Abuse: Current or Past(Physical, Sexual or Emotional)- No  Do you feel safe in your environment? Yes    Have you ever done Advance Care Planning? (For example, a Health Directive, POLST, or a discussion with a medical provider or your loved ones about your wishes): No, advance care planning information given to patient to review.  Patient plans to discuss their wishes with loved ones or provider.      Social History     Tobacco Use     Smoking status: Never     Smokeless tobacco: Never     Tobacco comments:     no passive exposure   Substance Use Topics     Alcohol use: Yes     Comment: occ     If you drink alcohol do you typically have >3 drinks per day or >7 drinks per week? No    Alcohol Use 11/11/2022   Prescreen: >3 drinks/day or >7 drinks/week? No   Prescreen: >3 drinks/day or >7 drinks/week? -   No flowsheet data  found.    Last PSA: No results found for: PSA    Reviewed orders with patient. Reviewed health maintenance and updated orders accordingly - Yes  Lab work is in process  Labs reviewed in EPIC  BP Readings from Last 3 Encounters:   11/11/22 134/88   09/14/21 132/82   08/13/21 (!) 150/80    Wt Readings from Last 3 Encounters:   11/11/22 120.9 kg (266 lb 8 oz)   09/14/21 116.8 kg (257 lb 9.6 oz)   08/13/21 116.6 kg (257 lb 1.6 oz)                  Patient Active Problem List   Diagnosis     CARDIOVASCULAR SCREENING; LDL GOAL LESS THAN 160     Recurrent cold sores     Obesity (BMI 30-39.9)     Hypercholesteremia     Elevated fasting glucose     Vasovagal near-syncope     Elevated blood pressure reading without diagnosis of hypertension     Attention deficit disorder (ADD) without hyperactivity     Morbid obesity (H)     Past Surgical History:   Procedure Laterality Date     TONSILLECTOMY         Social History     Tobacco Use     Smoking status: Never     Smokeless tobacco: Never     Tobacco comments:     no passive exposure   Substance Use Topics     Alcohol use: Yes     Comment: occ     History reviewed. No pertinent family history.      Current Outpatient Medications   Medication Sig Dispense Refill     methylphenidate (CONCERTA) 54 MG CR tablet Take 1 tablet (54 mg) by mouth daily for 30 days 30 tablet 0     [START ON 12/12/2022] methylphenidate (CONCERTA) 54 MG CR tablet Take 1 tablet (54 mg) by mouth daily for 30 days 30 tablet 0     [START ON 1/12/2023] methylphenidate (CONCERTA) 54 MG CR tablet Take 1 tablet (54 mg) by mouth daily for 30 days 30 tablet 0     methylphenidate (CONCERTA) 54 MG CR tablet Take 1 tablet (54 mg) by mouth daily Call to schedule for medication recheck before next refill is due 30 tablet 0     No Known Allergies  Recent Labs   Lab Test 12/16/20  0742 11/05/18  0727 05/04/18  0849 05/23/17  0933   A1C  --  5.1  --  5.0   * 142* 135* 134*   HDL 37* 34* 36* 45   TRIG 127 124 92 77    ALT  --   --  50 65   CR  --   --  1.00 0.92   GFRESTIMATED  --   --  87 >90  Non  GFR Calc     GFRESTBLACK  --   --  >90 >90  African American GFR Calc     POTASSIUM  --   --  4.0 4.0   TSH  --   --   --  1.25        Reviewed and updated as needed this visit by clinical staff   Tobacco  Allergies  Meds   Med Hx  Surg Hx  Fam Hx  Soc Hx        Reviewed and updated as needed this visit by Provider                   Past Medical History:   Diagnosis Date     adhd       Past Surgical History:   Procedure Laterality Date     TONSILLECTOMY       OB History   No obstetric history on file.       Review of Systems   Constitutional: Negative for chills and fever.   HENT: Negative for congestion, ear pain, hearing loss and sore throat.    Eyes: Negative for pain and visual disturbance.   Respiratory: Negative for cough and shortness of breath.    Cardiovascular: Negative for chest pain, palpitations and peripheral edema.   Gastrointestinal: Negative for abdominal pain, constipation, diarrhea, heartburn, hematochezia and nausea.   Genitourinary: Negative for dysuria, frequency, genital sores, hematuria and urgency.   Musculoskeletal: Negative for arthralgias, joint swelling and myalgias.   Skin: Negative for rash.   Neurological: Negative for dizziness, weakness, headaches and paresthesias.   Psychiatric/Behavioral: Negative for mood changes. The patient is not nervous/anxious.      CONSTITUTIONAL: NEGATIVE for fever, chills, change in weight  INTEGUMENTARY/SKIN: NEGATIVE for worrisome rashes, moles or lesions  EYES: NEGATIVE for vision changes or irritation  ENT: NEGATIVE for ear, mouth and throat problems  RESP: NEGATIVE for significant cough or SOB  CV: NEGATIVE for chest pain, palpitations or peripheral edema  GI: NEGATIVE for nausea, abdominal pain, heartburn, or change in bowel habits   male: negative for dysuria, hematuria, decreased urinary stream, erectile dysfunction, urethral  "discharge  MUSCULOSKELETAL: NEGATIVE for significant arthralgias or myalgia  NEURO: NEGATIVE for weakness, dizziness or paresthesias  ENDOCRINE: NEGATIVE for temperature intolerance, skin/hair changes  HEME/ALLERGY/IMMUNE: NEGATIVE for bleeding problems  PSYCHIATRIC: NEGATIVE for changes in mood or affect  PSYCHIATRIC: History of ADD    OBJECTIVE:   /88 (BP Location: Right arm, Patient Position: Sitting, Cuff Size: Adult Large)   Pulse 75   Temp 98.1  F (36.7  C) (Oral)   Resp 18   Ht 1.765 m (5' 9.5\")   Wt 120.9 kg (266 lb 8 oz)   SpO2 98%   BMI 38.79 kg/m      Physical Exam  GENERAL: healthy, alert and no distress  EYES: Eyes grossly normal to inspection, PERRL and conjunctivae and sclerae normal  HENT: ear canals and TM's normal, nose and mouth without ulcers or lesions  NECK: no adenopathy, no asymmetry, masses, or scars and thyroid normal to palpation  RESP: lungs clear to auscultation - no rales, rhonchi or wheezes  CV: regular rate and rhythm, normal S1 S2, no S3 or S4, no murmur, click or rub, no peripheral edema and peripheral pulses strong  ABDOMEN: soft, nontender, no hepatosplenomegaly, no masses and bowel sounds normal  MS: no gross musculoskeletal defects noted, no edema  SKIN: no suspicious lesions or rashes  NEURO: Normal strength and tone, mentation intact and speech normal  PSYCH: mentation appears normal, affect normal/bright    Diagnostic Test Results:  Labs reviewed in Epic    ASSESSMENT/PLAN:   (Z00.00) Routine general medical examination at a health care facility  (primary encounter diagnosis)  Comment:   Plan: : Discussed on regular exercises, healthy eating,   and routine dental checks.    (F98.8) Attention deficit disorder (ADD) without hyperactivity  Comment:   Plan: methylphenidate (CONCERTA) 54 MG CR tablet,         methylphenidate (CONCERTA) 54 MG CR tablet,         methylphenidate (CONCERTA) 54 MG CR tablet        Stable, continue current medications, treatment for " "placement, follow-up for recheck virtually in 6 months or sooner if needed    (Z23) High priority for COVID-19 vaccination  Comment:   Plan: COVID-19,PF,PFIZER BOOSTER BIVALENT            (E66.01) Morbid obesity (H)  Comment:   Plan:   Wt Readings from Last 5 Encounters:   11/11/22 120.9 kg (266 lb 8 oz)   09/14/21 116.8 kg (257 lb 9.6 oz)   08/13/21 116.6 kg (257 lb 1.6 oz)   12/16/20 113.2 kg (249 lb 9.6 oz)   12/06/19 117.5 kg (259 lb 1.6 oz)     Emphasized on weight loss, portion control, low calorie and low fat diet, healthy eating, regular exercises.  Discussed about intermittent fasting/time restricted feeding as an aid to help with weight loss  Start checking weight at home once a week ,keep a log,  We will review at the next visit in 6 months or sooner if needed  In the meantime patient will get the fasting labs done in the next 1 to 2 weeks          COUNSELING:   Reviewed preventive health counseling, as reflected in patient instructions  Special attention given to:        Regular exercise       Healthy diet/nutrition       Vision screening       Immunizations    Vaccinated for: COVID booster        Estimated body mass index is 38.79 kg/m  as calculated from the following:    Height as of this encounter: 1.765 m (5' 9.5\").    Weight as of this encounter: 120.9 kg (266 lb 8 oz).     Weight management plan: Discussed healthy diet and exercise guidelines    He reports that he has never smoked. He has never used smokeless tobacco.      Counseling Resources:  ATP IV Guidelines  Pooled Cohorts Equation Calculator  FRAX Risk Assessment  ICSI Preventive Guidelines  Dietary Guidelines for Americans, 2010  USDA's MyPlate  ASA Prophylaxis  Lung CA Screening    López Tristan MD  [facility]  Chart documentation done in part with Dragon Voice recognition Software. Although reviewed after completion, some word and grammatical error may remain.    "

## 2022-11-15 ENCOUNTER — MYC MEDICAL ADVICE (OUTPATIENT)
Dept: FAMILY MEDICINE | Facility: CLINIC | Age: 37
End: 2022-11-15

## 2022-11-15 DIAGNOSIS — F98.8 ATTENTION DEFICIT DISORDER (ADD) WITHOUT HYPERACTIVITY: ICD-10-CM

## 2022-11-16 RX ORDER — METHYLPHENIDATE HYDROCHLORIDE 54 MG/1
54 TABLET ORAL DAILY
Qty: 30 TABLET | Refills: 0 | Status: SHIPPED | OUTPATIENT
Start: 2022-11-16 | End: 2023-05-15

## 2022-11-16 NOTE — TELEPHONE ENCOUNTER
Patient needs recent refill of concerta sent to new pharmacy due to being out of stock at regular pharmacy.     Sachi Mcfarland BSN, RN

## 2023-05-15 ENCOUNTER — VIRTUAL VISIT (OUTPATIENT)
Dept: FAMILY MEDICINE | Facility: CLINIC | Age: 38
End: 2023-05-15
Payer: COMMERCIAL

## 2023-05-15 DIAGNOSIS — F98.8 ATTENTION DEFICIT DISORDER (ADD) WITHOUT HYPERACTIVITY: Primary | ICD-10-CM

## 2023-05-15 DIAGNOSIS — R03.0 ELEVATED BLOOD PRESSURE READING WITHOUT DIAGNOSIS OF HYPERTENSION: ICD-10-CM

## 2023-05-15 DIAGNOSIS — E66.01 MORBID OBESITY (H): ICD-10-CM

## 2023-05-15 PROCEDURE — 99213 OFFICE O/P EST LOW 20 MIN: CPT | Mod: VID | Performed by: FAMILY MEDICINE

## 2023-05-15 RX ORDER — METHYLPHENIDATE HYDROCHLORIDE 54 MG/1
54 TABLET ORAL DAILY
Qty: 30 TABLET | Refills: 0 | Status: SHIPPED | OUTPATIENT
Start: 2023-05-15 | End: 2023-05-15

## 2023-05-15 NOTE — PROGRESS NOTES
"Tu is a 38 year old who is being evaluated via a billable video visit.      How would you like to obtain your AVS? MyChart  If the video visit is dropped, the invitation should be resent by: Text to cell phone: [phone]  Will anyone else be joining your video visit? No        Assessment & Plan     Attention deficit disorder (ADD) without hyperactivity  Stable, continue current dose of Concerta 54 mg daily, recheck in 6 months at the time of physical or sooner if needed  Patient has difficulty getting the prescription due to availability at the pharmacies lately  He will check with his pharmacy today  If it is still not available, patient to let us know so we can send alternate prescriptions either for Adderall or Vyvanse  Patient verbalised understanding and is agreeable to the plan.    - methylphenidate (CONCERTA) 54 MG CR tablet; Take 1 tablet (54 mg) by mouth daily    Morbid obesity (H)  Stable, patient has lost 5 pounds in the past few months  Continue to work on regular exercises, healthy eating, weight loss efforts    Elevated blood pressure reading without diagnosis of hypertension  Home blood pressure reading few days ago was 128/70, continue to monitor  Will follow low salt diet, weight loss and regular exercises.        [id number]     BMI:   Estimated body mass index is 38.79 kg/m  as calculated from the following:    Height as of 11/11/22: 1.765 m (5' 9.5\").    Weight as of 11/11/22: 120.9 kg (266 lb 8 oz).   Weight management plan: Discussed healthy diet and exercise guidelines    Work on weight loss  Regular exercise  Chart documentation done in part with Dragon Voice recognition Software. Although reviewed after completion, some word and grammatical error may remain.    See Patient Instructions    López Tristan MD  [facility]    Dominick Mae is a 38 year old, presenting for the following health issues:  A.D.H.D  Patient is here for a video visit instead of in " person visit due to the current COVID-19 pandemic.        5/15/2023     8:40 AM   Additional Questions   Roomed by Kathy GREEN     Medication Followup of CONCERTA    Taking Medication as prescribed: NO- unable to get medication     Side Effects:  None    Medication Helping Symptoms:  yes    Medication Followup of Concerta, has difficulty getting the prescriptions filled at the pharmacy due to nonavailability  Patient has been on Concerta for a few years now  Was on immediate release Ritalin before    Taking Medication as prescribed: yes    Side Effects:  None    Medication Helping Symptoms:  yes        Review of Systems   CONSTITUTIONAL: NEGATIVE for fever, chills, change in weight  RESP: NEGATIVE for significant cough or SOB  CV: NEGATIVE for chest pain, palpitations or peripheral edema  NEURO: NEGATIVE for weakness, dizziness or paresthesias  PSYCHIATRIC: NEGATIVE for changes in mood or affect and history of ADHD      Objective           Vitals:  No vitals were obtained today due to virtual visit.    Physical Exam   GENERAL: Healthy, alert and no distress  EYES: Eyes grossly normal to inspection  RESP: No audible wheeze, cough, or visible cyanosis.  No visible retractions or increased work of breathing.    NEURO: Cranial nerves grossly intact.  Mentation and speech appropriate for age.  PSYCH: Mentation appears normal, affect normal/bright, judgement and insight intact, normal speech and appearance well-groomed.                Video-Visit Details    Type of service:  Video Visit     Originating Location (pt. Location): Home  Distant Location (provider location):  Off-site  Platform used for Video Visit: Leo

## 2023-09-27 NOTE — TELEPHONE ENCOUNTER
Is due for physical, 6 month recheck in 4/2017  30 days supply given.  Rx is ready for . Please notify patient.      
Message from "[employer]"t:  Original authorizing provider: MD Tu Pham would like a refill of the following medications:  methylphenidate ER (CONCERTA) 54 MG CR tablet [López Tristan MD]    Preferred pharmacy: [facility] [address], [address]    Comment:  the last refill request I was only given a 30 day supply  
Rx tubed to onsite pharmacy. Sent mychart to inform patient.  Joni Cordero, CMA    
methylphenidate ER (CONCERTA) 54 MG CR tablet      Last Written Prescription Date:  2/10/17  Last Fill Quantity: 90,   # refills: 0  Last Office Visit with [facility], [facility] or [facility] prescribing provider: 10/31/16  Future Office visit:       Routing refill request to provider for review/approval because:  Drug not on the [facility], [facility] or [facility] refill protocol or controlled substance    
No

## 2023-10-13 ENCOUNTER — PATIENT OUTREACH (OUTPATIENT)
Dept: CARE COORDINATION | Facility: CLINIC | Age: 38
End: 2023-10-13
Payer: COMMERCIAL

## 2023-10-27 ENCOUNTER — PATIENT OUTREACH (OUTPATIENT)
Dept: CARE COORDINATION | Facility: CLINIC | Age: 38
End: 2023-10-27
Payer: COMMERCIAL

## 2024-02-10 ENCOUNTER — HEALTH MAINTENANCE LETTER (OUTPATIENT)
Age: 39
End: 2024-02-10

## 2024-09-23 ENCOUNTER — VIRTUAL VISIT (OUTPATIENT)
Dept: FAMILY MEDICINE | Facility: CLINIC | Age: 39
End: 2024-09-23
Payer: COMMERCIAL

## 2024-09-23 ENCOUNTER — TELEPHONE (OUTPATIENT)
Dept: FAMILY MEDICINE | Facility: CLINIC | Age: 39
End: 2024-09-23

## 2024-09-23 DIAGNOSIS — Z13.0 SCREENING FOR DEFICIENCY ANEMIA: ICD-10-CM

## 2024-09-23 DIAGNOSIS — Z13.1 SCREENING FOR DIABETES MELLITUS (DM): ICD-10-CM

## 2024-09-23 DIAGNOSIS — Z13.220 SCREENING FOR HYPERLIPIDEMIA: ICD-10-CM

## 2024-09-23 DIAGNOSIS — F98.8 ATTENTION DEFICIT DISORDER (ADD) WITHOUT HYPERACTIVITY: Primary | ICD-10-CM

## 2024-09-23 PROCEDURE — G2211 COMPLEX E/M VISIT ADD ON: HCPCS | Mod: 95 | Performed by: FAMILY MEDICINE

## 2024-09-23 PROCEDURE — 99213 OFFICE O/P EST LOW 20 MIN: CPT | Mod: 95 | Performed by: FAMILY MEDICINE

## 2024-09-23 RX ORDER — METHYLPHENIDATE HYDROCHLORIDE 36 MG/1
36 TABLET ORAL DAILY
Qty: 30 TABLET | Refills: 0 | Status: SHIPPED | OUTPATIENT
Start: 2024-10-23 | End: 2024-11-22

## 2024-09-23 RX ORDER — METHYLPHENIDATE HYDROCHLORIDE 36 MG/1
36 TABLET ORAL DAILY
Qty: 30 TABLET | Refills: 0 | Status: SHIPPED | OUTPATIENT
Start: 2024-11-22 | End: 2024-12-22

## 2024-09-23 RX ORDER — METHYLPHENIDATE HYDROCHLORIDE 36 MG/1
36 TABLET ORAL EVERY MORNING
Qty: 30 TABLET | Refills: 0 | Status: CANCELLED | OUTPATIENT
Start: 2024-09-23

## 2024-09-23 RX ORDER — METHYLPHENIDATE HYDROCHLORIDE 36 MG/1
36 TABLET ORAL DAILY
Qty: 30 TABLET | Refills: 0 | Status: SHIPPED | OUTPATIENT
Start: 2024-09-23 | End: 2024-10-23

## 2024-09-23 NOTE — TELEPHONE ENCOUNTER
"Prior Authorization Retail Medication Request    Medication/Dose: methylphenidate HCl ER, OSM, (CONCERTA) 36 MG CR tablet  Diagnosis and ICD code (if different than what is on RX):    New/renewal/insurance change PA/secondary ins. PA:  Previously Tried and Failed:    Rationale:      Insurance   Primary: [employer]   Insurance ID:  [id number]     Secondary (if applicable):  Insurance ID:      Pharmacy Information (if different than what is on RX)  Name:    Phone:    Fax:  Login to [employer]/login and click \" Enter a Key\"  Enter provided information bellow  Key: [id number]  Patient Last Name: Shiv  : 1985  Complete the PA and click \"Send to Plan\" for approval      "

## 2024-09-23 NOTE — PROGRESS NOTES
"  If patient has telephone visit, have they been educated on video visit as preferred visit method and offered to change to video visit? N/A        Instructions Relayed to Patient by Virtual Roomer:     Patient is active on mascotsecret:   Relayed following to patient: \"It looks like you are active on mascotsecret, are you able to join the visit this way? If not, do you need us to send you a link now or would you like your provider to send a link via text or email when they are ready to initiate the visit?\"      Patient Confirmed they will join visit via: Text Link to Cell Phone  Reminded patient to ensure they were logged on to virtual visit by arrival time listed.   Asked if patient has flexibility to initiate visit sooner than arrival time: patient stated yes, documented in appointment notes availability to initiate visit earlier than arrival time     If pediatric virtual visit, ensured pediatric patient along with parent/guardian will be present for video visit.     Patient offered the website [email or web address] and/or phone number to [employer]secret Help line: [phone]    Tu is a 39 year old who is being evaluated via a billable video visit.    How would you like to obtain your AVS? [employer]  If the video visit is dropped, the invitation should be resent by: Text to cell phone: [phone]  Will anyone else be joining your video visit? No      Assessment & Plan     Attention deficit disorder (ADD) without hyperactivity  Restart back on Concerta 36 mg daily  Follow for recheck in 2 to 3 months with remote physical or sooner if needed  - methylphenidate HCl ER, OSM, (CONCERTA) 36 MG CR tablet; Take 1 tablet (36 mg) by mouth daily.  - methylphenidate HCl ER, OSM, (CONCERTA) 36 MG CR tablet; Take 1 tablet (36 mg) by mouth daily.  - methylphenidate HCl ER, OSM, (CONCERTA) 36 MG CR tablet; Take 1 tablet (36 mg) by mouth daily.    Screening for deficiency anemia  Labs ordered for his upcoming physical  - CBC " with platelets; Future    Screening for diabetes mellitus (DM)  as above    - Comprehensive metabolic panel (BMP + Alb, Alk Phos, ALT, AST, Total. Bili, TP); Future    Screening for hyperlipidemia  as above    - Lipid panel reflex to direct LDL Fasting; Future            Chart documentation done in part with Dragon Voice recognition Software. Although reviewed after completion, some word and grammatical error may remain.    See Patient Instructions    Dominick Mae is a 39 year old, presenting for the following health issues:  Patient is here for a video visit instead of in person visit due to the current COVID-19 pandemic.    Recheck Medication      9/23/2024     8:05 AM   Additional Questions   Roomed by Elba MILES   Accompanied by Self         9/23/2024     8:05 AM   Patient Reported Additional Medications   Patient reports taking the following new medications None     History of Present Illness       Reason for visit:  Med check    He eats 2-3 servings of fruits and vegetables daily.He consumes 2 sweetened beverage(s) daily.He exercises with enough effort to increase his heart rate 10 to 19 minutes per day.  He exercises with enough effort to increase his heart rate 4 days per week. He is missing 7 dose(s) of medications per week.  He is not taking prescribed medications regularly due to other.       Medication Followup of Concerta 36 mg, was on it until last year when he was not able to get it filled at the pharmacy due to prolonged backorder  Patient for significant effects in his symptoms including his attention, task completion and appetite suppression  Now that things have gotten better at the pharmacy site, he will start back on the medication.  Taking Medication as prescribed: As above  Side Effects:  None  Medication Helping Symptoms:  yes        Review of Systems  CONSTITUTIONAL: NEGATIVE for fever, chills, change in weight  RESP: NEGATIVE for significant cough or SOB  CV: NEGATIVE for chest pain,  palpitations or peripheral edema  PSYCHIATRIC: As above      Objective           Vitals:  No vitals were obtained today due to virtual visit.    Physical Exam   GENERAL: alert and no distress  EYES: Eyes grossly normal to inspection  RESP: No audible wheeze, cough, or visible cyanosis.    NEURO: Cranial nerves grossly intact.  Mentation and speech appropriate for age.  PSYCH: Appropriate affect, tone, and pace of words          Video-Visit Details    Type of service:  Video Visit   Originating Location (pt. Location): Home    Distant Location (provider location):  Off-site  Platform used for Video Visit: [facility]  Signed Electronically by: López Tristan MD

## 2024-09-26 NOTE — TELEPHONE ENCOUNTER
Prior Authorization Approval    Authorization Effective Date: 9/26/2024  Authorization Expiration Date: 9/26/2027  Medication: methylphenidate HCl ER, OSM, (CONCERTA) 36 MG CR tablet-PA APPROVED   Approved Dose/Quantity:   Reference #:     Insurance Company: [employer] - Phone [phone] Fax [phone]  Expected CoPay:       CoPay Card Available:      Foundation Assistance Needed:    Which Pharmacy is filling the prescription (Not needed for infusion/clinic administered): [id number] - [address] [facility]  Pharmacy Notified:  Yes- **Instructed pharmacy to notify patient when script is ready to /ship.**  Patient Notified:  Yes

## 2024-09-26 NOTE — TELEPHONE ENCOUNTER
Central Prior Authorization Team   Phone: [phone]    PA Initiation    Medication: methylphenidate HCl ER, OSM, (CONCERTA) 36 MG CR tablet  Insurance Company: [email or web address] - Phone [phone] Fax [phone]  Pharmacy Filling the Rx: [facility] #[id number] [place] [address]  Filling Pharmacy Phone: [phone]  Filling Pharmacy Fax:    Start Date: 9/26/2024

## 2024-12-19 ENCOUNTER — LAB (OUTPATIENT)
Dept: LAB | Facility: CLINIC | Age: 39
End: 2024-12-19
Payer: COMMERCIAL

## 2024-12-19 DIAGNOSIS — Z13.220 SCREENING FOR HYPERLIPIDEMIA: ICD-10-CM

## 2024-12-19 DIAGNOSIS — Z13.1 SCREENING FOR DIABETES MELLITUS (DM): ICD-10-CM

## 2024-12-19 DIAGNOSIS — Z13.0 SCREENING FOR DEFICIENCY ANEMIA: ICD-10-CM

## 2024-12-19 LAB
ALBUMIN SERPL BCG-MCNC: 4.6 G/DL (ref 3.5–5.2)
ALP SERPL-CCNC: 84 U/L (ref 40–150)
ALT SERPL W P-5'-P-CCNC: 46 U/L (ref 0–70)
ANION GAP SERPL CALCULATED.3IONS-SCNC: 12 MMOL/L (ref 7–15)
AST SERPL W P-5'-P-CCNC: 37 U/L (ref 0–45)
BILIRUB SERPL-MCNC: 0.6 MG/DL
BUN SERPL-MCNC: 13.2 MG/DL (ref 6–20)
CALCIUM SERPL-MCNC: 9.3 MG/DL (ref 8.8–10.4)
CHLORIDE SERPL-SCNC: 106 MMOL/L (ref 98–107)
CHOLEST SERPL-MCNC: 179 MG/DL
CREAT SERPL-MCNC: 1.11 MG/DL (ref 0.67–1.17)
EGFRCR SERPLBLD CKD-EPI 2021: 88 ML/MIN/1.73M2
ERYTHROCYTE [DISTWIDTH] IN BLOOD BY AUTOMATED COUNT: 13 % (ref 10–15)
FASTING STATUS PATIENT QL REPORTED: YES
FASTING STATUS PATIENT QL REPORTED: YES
GLUCOSE SERPL-MCNC: 101 MG/DL (ref 70–99)
HCO3 SERPL-SCNC: 24 MMOL/L (ref 22–29)
HCT VFR BLD AUTO: 46.6 % (ref 40–53)
HDLC SERPL-MCNC: 33 MG/DL
HGB BLD-MCNC: 15.1 G/DL (ref 13.3–17.7)
LDLC SERPL CALC-MCNC: 125 MG/DL
MCH RBC QN AUTO: 28.5 PG (ref 26.5–33)
MCHC RBC AUTO-ENTMCNC: 32.4 G/DL (ref 31.5–36.5)
MCV RBC AUTO: 88 FL (ref 78–100)
NONHDLC SERPL-MCNC: 146 MG/DL
PLATELET # BLD AUTO: 255 10E3/UL (ref 150–450)
POTASSIUM SERPL-SCNC: 4 MMOL/L (ref 3.4–5.3)
PROT SERPL-MCNC: 7 G/DL (ref 6.4–8.3)
RBC # BLD AUTO: 5.29 10E6/UL (ref 4.4–5.9)
SODIUM SERPL-SCNC: 142 MMOL/L (ref 135–145)
TRIGL SERPL-MCNC: 106 MG/DL
WBC # BLD AUTO: 7.6 10E3/UL (ref 4–11)

## 2024-12-20 PROBLEM — H91.92 HEARING DEFICIT, LEFT: Status: ACTIVE | Noted: 2024-12-20

## 2025-03-23 ENCOUNTER — MYC REFILL (OUTPATIENT)
Dept: FAMILY MEDICINE | Facility: CLINIC | Age: 40
End: 2025-03-23
Payer: COMMERCIAL

## 2025-03-23 DIAGNOSIS — F98.8 ATTENTION DEFICIT DISORDER (ADD) WITHOUT HYPERACTIVITY: ICD-10-CM

## 2025-03-24 RX ORDER — METHYLPHENIDATE HYDROCHLORIDE 36 MG/1
36 TABLET ORAL DAILY
Qty: 30 TABLET | Refills: 0 | Status: SHIPPED | OUTPATIENT
Start: 2025-03-24 | End: 2025-03-24

## 2025-03-24 RX ORDER — METHYLPHENIDATE HYDROCHLORIDE 36 MG/1
36 TABLET ORAL DAILY
Qty: 30 TABLET | Refills: 0 | Status: SHIPPED | OUTPATIENT
Start: 2025-03-24

## 2025-07-01 ENCOUNTER — VIRTUAL VISIT (OUTPATIENT)
Dept: FAMILY MEDICINE | Facility: CLINIC | Age: 40
End: 2025-07-01
Payer: COMMERCIAL

## 2025-07-01 DIAGNOSIS — R03.0 ELEVATED BLOOD PRESSURE READING WITHOUT DIAGNOSIS OF HYPERTENSION: ICD-10-CM

## 2025-07-01 DIAGNOSIS — E66.01 MORBID OBESITY (H): ICD-10-CM

## 2025-07-01 DIAGNOSIS — F98.8 ATTENTION DEFICIT DISORDER (ADD) WITHOUT HYPERACTIVITY: Primary | ICD-10-CM

## 2025-07-01 PROCEDURE — 98005 SYNCH AUDIO-VIDEO EST LOW 20: CPT | Performed by: FAMILY MEDICINE

## 2025-07-01 RX ORDER — METHYLPHENIDATE HYDROCHLORIDE 36 MG/1
36 TABLET ORAL DAILY
Qty: 30 TABLET | Refills: 0 | Status: SHIPPED | OUTPATIENT
Start: 2025-07-30 | End: 2025-08-29

## 2025-07-01 RX ORDER — METHYLPHENIDATE HYDROCHLORIDE 36 MG/1
36 TABLET ORAL DAILY
Qty: 30 TABLET | Refills: 0 | Status: SHIPPED | OUTPATIENT
Start: 2025-08-29 | End: 2025-09-28

## 2025-07-01 RX ORDER — METHYLPHENIDATE HYDROCHLORIDE 36 MG/1
36 TABLET ORAL DAILY
Qty: 30 TABLET | Refills: 0 | Status: SHIPPED | OUTPATIENT
Start: 2025-09-28 | End: 2025-10-28

## 2025-07-01 NOTE — PROGRESS NOTES
"  If patient has telephone visit, have they been educated on video visit as preferred visit method and offered to change to video visit? NOT APPLICABLE        Instructions Relayed to Patient by Virtual Roomer:     Patient is active on [employer]:   Relayed following to patient: \"It looks like you are active on Ret[employer], are you able to join the visit this way? If not, do you need us to send you a link now or would you like your provider to send a link via text or email when they are ready to initiate the visit?\"      Patient Confirmed they will join visit via: Jumptap  Reminded patient to ensure they were logged on to virtual visit by arrival time listed.   Asked if patient has flexibility to initiate visit sooner than arrival time: patient is unable to initiate visit earlier than arrival time     If pediatric virtual visit, ensured pediatric patient along with parent/guardian will be present for video visit.     Patient offered the website [email or web address] and/or phone number to [employer] Help line: [phone]      Tu is a 40 year old who is being evaluated via a billable video visit.    How would you like to obtain your AVS? Jumptap  If the video visit is dropped, the invitation should be resent by: Text to cell phone: [phone]  Will anyone else be joining your video visit? No      Assessment & Plan     Attention deficit disorder (ADD) without hyperactivity  Stable, continue with current dose of Concerta 36 mg daily, recheck in 6 months at the time of physical or sooner if needed  - methylphenidate HCl ER, OSM, (CONCERTA) 36 MG CR tablet; Take 1 tablet (36 mg) by mouth daily.  - methylphenidate HCl ER, OSM, (CONCERTA) 36 MG CR tablet; Take 1 tablet (36 mg) by mouth daily.  - methylphenidate HCl ER, OSM, (CONCERTA) 36 MG CR tablet; Take 1 tablet (36 mg) by mouth daily.    Elevated blood pressure reading without diagnosis of hypertension  BP Readings from Last 6 Encounters:   12/20/24 (!) " "144/88   11/11/22 134/88   09/14/21 132/82   08/13/21 (!) 150/80   12/16/20 134/80   12/06/19 138/84     Recommended patient to start checking blood pressures at home along with daily blood pressure readings for at least 2 weeks before his next visit in 6 months  Will follow low salt diet, weight loss and regular exercises.  Consider starting on antihypertensives  for persistent or worsening concerns at the next visit.      Morbid obesity (H)  Wt Readings from Last 5 Encounters:   12/20/24 120 kg (264 lb 8 oz)   11/11/22 120.9 kg (266 lb 8 oz)   09/14/21 116.8 kg (257 lb 9.6 oz)   08/13/21 116.6 kg (257 lb 1.6 oz)   12/16/20 113.2 kg (249 lb 9.6 oz)     BMI of 38.73   continue efforts on healthy eating, regular exercises and portion control.              BMI  Estimated body mass index is 38.73 kg/m  as calculated from the following:    Height as of 12/20/24: 1.76 m (5' 9.29\").    Weight as of 12/20/24: 120 kg (264 lb 8 oz).   Weight management plan: As above      Follow-up   Return in about 6 months (around 12/20/2025) for previsit labs, Physical Exam, fasting labs.        Dominick Mae is a 40 year old, presenting for the following health issues:  A.D.H.D  Patient is here for a video visit instead of in person visit due to the current COVID-19 pandemic.        7/1/2025     7:57 AM   Additional Questions   Roomed by Espinoza   Accompanied by self     HPI      Medication Followup of ADHD  Taking Medication as prescribed: yes  Side Effects:  None  Medication Helping Symptoms:  yes          Review of Systems  CONSTITUTIONAL: NEGATIVE for fever, chills, change in weight  RESP: NEGATIVE for significant cough or SOB  CV: NEGATIVE for chest pain, palpitations or peripheral edema  CV: Elevated blood pressure  MUSCULOSKELETAL: NEGATIVE for significant arthralgias or myalgia  NEURO: NEGATIVE for weakness, dizziness or paresthesias  ENDOCRINE: NEGATIVE for temperature intolerance, skin/hair changes  PSYCHIATRIC: NEGATIVE " for changes in mood or affect   history of ADHD        Objective           Vitals:  No vitals were obtained today due to virtual visit.    Physical Exam   GENERAL: alert and no distress  EYES: Eyes grossly normal to inspection  RESP: No audible wheeze, cough, or visible cyanosis.    NEURO: Cranial nerves grossly intact.  Mentation and speech appropriate for age.  PSYCH: Appropriate affect, tone, and pace of words          Video-Visit Details    Type of service:  Video Visit   Originating Location (pt. Location): Home    Distant Location (provider location):  On-site  Platform used for Video Visit: Leo  Signed Electronically by: López Tristan MD